# Patient Record
Sex: MALE | Race: WHITE | NOT HISPANIC OR LATINO | Employment: OTHER | URBAN - METROPOLITAN AREA
[De-identification: names, ages, dates, MRNs, and addresses within clinical notes are randomized per-mention and may not be internally consistent; named-entity substitution may affect disease eponyms.]

---

## 2017-01-24 ENCOUNTER — TRANSFERRED RECORDS (OUTPATIENT)
Dept: HEALTH INFORMATION MANAGEMENT | Facility: CLINIC | Age: 62
End: 2017-01-24

## 2017-01-31 ENCOUNTER — TRANSFERRED RECORDS (OUTPATIENT)
Dept: HEALTH INFORMATION MANAGEMENT | Facility: CLINIC | Age: 62
End: 2017-01-31

## 2017-02-02 ENCOUNTER — PRE VISIT (OUTPATIENT)
Dept: OTOLARYNGOLOGY | Facility: CLINIC | Age: 62
End: 2017-02-02

## 2017-02-02 NOTE — TELEPHONE ENCOUNTER
1.  Date/reason for appt: 2/21/17- hearing loss, discuss surgery     2.  Referring provider: Dr. Coyne    3.  Call to patient (Yes / No - short description): No, was transferred to [facility] voicemail.     4.  Previous care at / records requested from: [facility] - faxed cover sheet to [phone].

## 2017-02-06 NOTE — TELEPHONE ENCOUNTER
Records received from [facility], will forward to clinic.  Included:   Audiogram on 1/24/17  10/17/16 septoplasty

## 2017-02-20 DIAGNOSIS — H91.90 HEARING LOSS: Primary | ICD-10-CM

## 2017-02-21 ENCOUNTER — OFFICE VISIT (OUTPATIENT)
Dept: AUDIOLOGY | Facility: CLINIC | Age: 62
End: 2017-02-21

## 2017-02-21 ENCOUNTER — OFFICE VISIT (OUTPATIENT)
Dept: OTOLARYNGOLOGY | Facility: CLINIC | Age: 62
End: 2017-02-21

## 2017-02-21 VITALS — BODY MASS INDEX: 29.12 KG/M2 | HEIGHT: 71 IN | WEIGHT: 208 LBS

## 2017-02-21 DIAGNOSIS — H90.5 SENSORINEURAL HEARING LOSS OF RIGHT EAR: ICD-10-CM

## 2017-02-21 DIAGNOSIS — H93.13 TINNITUS OF BOTH EARS: ICD-10-CM

## 2017-02-21 DIAGNOSIS — H90.2 CONDUCTIVE HEARING LOSS: Primary | ICD-10-CM

## 2017-02-21 DIAGNOSIS — H90.72 MIXED HEARING LOSS OF LEFT EAR: ICD-10-CM

## 2017-02-21 ASSESSMENT — PAIN SCALES - GENERAL: PAINLEVEL: MILD PAIN (2)

## 2017-02-21 NOTE — MR AVS SNAPSHOT
After Visit Summary   2/21/2017    Jeffy Machado    MRN: [id number]           Patient Information     Date Of Birth          1955        Visit Information        Provider Department      2/21/2017 8:30 AM Mariana Tovar AuD [facility] Audiology        Today's Diagnoses     Sensorineural hearing loss of right ear        Mixed hearing loss of left ear        Tinnitus of both ears           Follow-ups after your visit        Your next 10 appointments already scheduled     Feb 21, 2017  9:10 AM CST   (Arrive by 8:55 AM)   New Patient Visit with Mira Kahn MD   [facility] Th[facility] ([facility] [place])    [address]  [address] [facility] [address] [phone]              Who to contact     Please call your clinic at [phone] to:    Ask questions about your health    Make or cancel appointments    Discuss your medicines    Learn about your test results    Speak to your doctor   If you have compliments or concerns about an experience at your clinic, or if you wish to file a complaint, please contact [facility] Patient Relations at [phone] or email us at [email or web address]         Additional Information About Your Visit        MyChart Information     [employer] gives you secure access to your electronic health record. If you see a primary care provider, you can also send messages to your care team and make appointments. If you have questions, please call your primary care clinic.  If you do not have a primary care provider, please call [phone] and they will assist you.      [employer] is an electronic gateway that provides easy, online access to your medical records. With [employer], you can request a clinic appointment, read your test results, renew a prescription or communicate with your care team.     To access your existing account, please contact your [facility] or  call [phone] for assistance.        Care EveryWhere ID     This is your Care EveryWhere ID. This could be used by other organizations to access your [facility] medical records  [id number]         Blood Pressure from Last 3 Encounters:   No data found for BP    Weight from Last 3 Encounters:   No data found for Wt              We Performed the Following     AUDIOGRAM/TYMPANOGRAM - INTERFACE     [facility] ([id number])     Tymps / Reflex   ([id number])        Primary Care Provider    None Specified       No primary provider on file.        Thank you!     Thank you for choosing [facility] AUDIOLOGY  for your care. Our goal is always to provide you with excellent care. Hearing back from our patients is one way we can continue to improve our services. Please take a few minutes to complete the written survey that you may receive in the mail after your visit with us. Thank you!             Your Updated Medication List - Protect others around you: Learn how to safely use, store and throw away your medicines at www.disposemymeds.org.      Notice  As of 2/21/2017  8:49 AM    You have not been prescribed any medications.

## 2017-02-21 NOTE — LETTER
2/21/2017       RE: Jeffy Machado  [address] [address]     Dear Colleague,    Thank you for referring your patient, Jeffy Machado, to the [facility] EAR NOSE AND THROAT at [facility]. Please see a copy of my visit note below.    February 21, 2017         Sean Coyne MD   [facility]   [address], [address]   [place]   [address]      Dear Dr. Coyne:      Today I had the pleasure of meeting Jeffy Machado in consultation at your request for conductive hearing loss.      HISTORY OF PRESENT ILLNESS:  He is a 61-year-old man with a history of hearing loss over the past few years.  Mr. Machado reports that his hearing loss has been stable over the past two years or so.  He notices this hearing loss on the left.  He also has symptoms of aural fullness as well as occasional pain in the temporalis area on the left side.  Mr. Machado also reports clicking in the right ear but no pain or hearing loss on that side.  He has seen several ENT physicians prior to this visit.  Previous notes from [facility] report a left mixed hearing loss with a Carhart notch on audiograms in 2013 and 2014 (not available for review today).  Audiogram was repeated at [facility] in November of 2016 and was shown to have a left mixed hearing loss with a Carhart notch.  The patient was referred to Neurotology for further recommendations and also to Audiology to possibly pursue use of a hearing aid in that ear.  The patient is interested in pursuing surgical options.  He tried a hearing aid while in the Audiology Clinic and did not really like it because, while he could hear better, it did not remove his most bothersome symptom: aural fullness.  He reports that left aural fullness is much more bothersome to him than his hearing loss. He has no vertigo, dizziness, or significant tinnitus.         PAST MEDICAL HISTORY:  Deviated septum status post septoplasty.   Hypertension.      PAST SURGICAL HISTORY:  Septoplasty and bilateral inferior turbinate reduction in October, 2016.      MEDICATIONS:  Atenolol.     SOCIAL HISTORY:  Non-smoker.  Occasional alcohol.    FAMILY HISTORY:  Migraines in mother     REVIEW OF SYSTEMS:  10 point ROS was performed and reviewed, and was positive for nasal congestion, left aural fullness, right ear clicking.  Otherwise negative.     PHYSICAL EXAMINATION:  No acute distress.  Healthy appearing.  Head is atraumatic, normocephalic.  Extraocular movements are intact.  Pupils are equal round and reactive to light.  Face is House-Brackmann I out of VI bilaterally.  Both ears are examined under the microscope.  Bilateral tympanic membranes are intact with normal landmarks.  Middle ear clefts are well-aerated bilaterally.  512 Hz tuning fork lateralizes to left ear; on Rinne testing, left BC>AC and right AC>BC. Nasal passages with pink healthy mucosa.  Mild crusting in left nasal passage.  Septum straight and midline.  Oropharynx within normal limits.  Non-labored breathing on room air.  Cranial nerves II-XII grossly intact.        AUDIOGRAM:  Audiogram done on February 21, 2017.     Right:  Mild rising to normal hearing sloping to moderate severe sensorineural hearing loss in the high frequencies.  Word recognition score 96% at 55 dB.  Type A tympanogram.  Ipsilateral reflex present.  Left:  Severe mixed hearing loss in the low to mid-frequencies.  Air line closes gap with bone line at 3000 Hz then slopes back down to moderate mixed hearing loss at 6000 and 8000 Hz.  Word recognition score 100% at 100 dB.  Type A tympanogram.  Reflex is absent.        ASSESSMENT AND PLAN:  A 61-year-old man with a history of left hearing loss over the past few years and audiograms consistent with left otosclerosis or other conductive loss.  The patient reports a sensation of aural fullness as well, and indicated that the fullness is a much greater concern to him than  the hearing loss.  We explained that stapes surgery for otosclerosis is intended to improve hearing, and may not necessarily address his aural fullness if it is secondary to TMJ dysfunction or other causes.  The symptom can be observed with conductive losses, but his symptom seems out of proportion to what I have usually observed in patients with similar degrees of conductive loss. Given this, I recommended that he obtain a CT temporal bone to further assess structures in his middle ear and inner ear which will also give us more data to make a decision about surgery.  The patient will continue to think about a left hearing aid as well as surgical options.  We discussed what stapes surgery entails, reviewed the potential hearing benefits, and reviewed the common and serious risks in detail.    We will call him with the results of his CT and talk further at that time.     Thank you for the opportunity to participate in his care.      Michelle Burroughs MD   Otolaryngology, Head and Neck Surgery PGY2      Sincerely,       Mira Kahn M.D.    Neurotology    [phone]          TH/ms       I, Mira Kahn, saw this patient with the resident/fellow and agree with the resident s findings and plan of care as documented in the resident s/fellow s note. I was present for the entire procedure.      Again, thank you for allowing me to participate in the care of your patient.      Sincerely,    Mira Kahn MD

## 2017-02-21 NOTE — PROGRESS NOTES
AUDIOLOGY REPORT    SUMMARY: Audiology visit completed. See audiogram for results.      RECOMMENDATIONS: Follow-up with ENT.      Sasha Walsh, CCC-A  Licensed Audiologist  [place] #[id number]

## 2017-02-21 NOTE — MR AVS SNAPSHOT
After Visit Summary   2/21/2017    Jeffy Machado    MRN: [id number]           Patient Information     Date Of Birth          1955        Visit Information        Provider Department      2/21/2017 9:10 AM Mira Kahn MD [facility] Ear Nose and Throat        Today's Diagnoses     Conductive hearing loss    -  1      Care Instructions    -Please schedule CT scan to further evaluate hearing loss. Our clinic will call with results once available.        Follow-ups after your visit        Your next 10 appointments already scheduled     Mar 22, 2017 11:20 AM CDT   CT TEMPORAL ORBITAL SELLA W/O CONTRAST with UCCT1   [facility] ([facility])    [address]  [facility] [address] [phone]           Please bring any scans or X-rays taken at other hospitals, if similar tests were done. Also bring a list of your medicines, including vitamins, minerals and over-the-counter drugs. It is safest to leave personal items at home.  Be sure to tell your doctor:   If you have any allergies.   If there s any chance you are pregnant.   If you are breastfeeding.   If you have any special needs.  You do not need to do anything special to prepare.  Please wear loose clothing, such as a sweat suit or jogging clothes. Avoid snaps, zippers and other metal. We may ask you to undress and put on a hospital gown.              Who to contact     Please call your clinic at [phone] to:    Ask questions about your health    Make or cancel appointments    Discuss your medicines    Learn about your test results    Speak to your doctor   If you have compliments or concerns about an experience at your clinic, or if you wish to file a complaint, please contact [facility] Patient Relations at [phone] or email us at [email or web address]         Additional Information About Your Visit        MyChart Information      "[employer] gives you secure access to your electronic health record. If you see a primary care provider, you can also send messages to your care team and make appointments. If you have questions, please call your primary care clinic.  If you do not have a primary care provider, please call [phone] and they will assist you.      [employer] is an electronic gateway that provides easy, online access to your medical records. With [employer], you can request a clinic appointment, read your test results, renew a prescription or communicate with your care team.     To access your existing account, please contact your [facility] or call [phone] for assistance.        Care EveryWhere ID     This is your Care EveryWhere ID. This could be used by other organizations to access your [facility] medical records  [id number]        Your Vitals Were     Height BMI (Body Mass Index)                1.791 m (5' 10.5\") 29.42 kg/m2           Blood Pressure from Last 3 Encounters:   No data found for BP    Weight from Last 3 Encounters:   02/21/17 94.3 kg (208 lb)              We Performed the Following     BINOCULAR MICROSCOPY        Primary Care Provider    None Specified       No primary provider on file.        Thank you!     Thank you for choosing [facility] EAR NOSE AND THROAT  for your care. Our goal is always to provide you with excellent care. Hearing back from our patients is one way we can continue to improve our services. Please take a few minutes to complete the written survey that you may receive in the mail after your visit with us. Thank you!             Your Updated Medication List - Protect others around you: Learn how to safely use, store and throw away your medicines at www.disposemymeds.org.          This list is accurate as of: 2/21/17 11:59 PM.  Always use your most recent med list.                   Brand Name Dispense Instructions for use    ATENOLOL PO      Take 25 mg by mouth daily "

## 2017-02-21 NOTE — PATIENT INSTRUCTIONS
-Please schedule CT scan to further evaluate hearing loss. Our clinic will call with results once available.

## 2017-02-21 NOTE — PROGRESS NOTES
February 21, 2017         Sean Coyne MD   [facility]   [address]   [place] [address]      Dear Dr. Coyne:      Today I had the pleasure of meeting Jeffy Machado in consultation at your request for conductive hearing loss.      HISTORY OF PRESENT ILLNESS:  He is a 61-year-old man with a history of hearing loss over the past few years.  Mr. Machado reports that his hearing loss has been stable over the past two years or so.  He notices this hearing loss on the left.  He also has symptoms of aural fullness as well as occasional pain in the temporalis area on the left side.  Mr. Machado also reports clicking in the right ear but no pain or hearing loss on that side.  He has seen several ENT physicians prior to this visit.  Previous notes from [facility] report a left mixed hearing loss with a Carhart notch on audiograms in 2013 and 2014 (not available for review today).  Audiogram was repeated at [facility] in November of 2016 and was shown to have a left mixed hearing loss with a Carhart notch.  The patient was referred to Neurotology for further recommendations and also to Audiology to possibly pursue use of a hearing aid in that ear.  The patient is interested in pursuing surgical options.  He tried a hearing aid while in the Audiology Clinic and did not really like it because, while he could hear better, it did not remove his most bothersome symptom: aural fullness.  He reports that left aural fullness is much more bothersome to him than his hearing loss. He has no vertigo, dizziness, or significant tinnitus.         PAST MEDICAL HISTORY:  Deviated septum status post septoplasty.  Hypertension.      PAST SURGICAL HISTORY:  Septoplasty and bilateral inferior turbinate reduction in October, 2016.      MEDICATIONS:  Atenolol.     SOCIAL HISTORY:  Non-smoker.  Occasional alcohol.    FAMILY HISTORY:  Migraines in mother     REVIEW OF SYSTEMS:  10 point ROS was performed  and reviewed, and was positive for nasal congestion, left aural fullness, right ear clicking.  Otherwise negative.     PHYSICAL EXAMINATION:  No acute distress.  Healthy appearing.  Head is atraumatic, normocephalic.  Extraocular movements are intact.  Pupils are equal round and reactive to light.  Face is House-Brackmann I out of VI bilaterally.  Both ears are examined under the microscope.  Bilateral tympanic membranes are intact with normal landmarks.  Middle ear clefts are well-aerated bilaterally.  512 Hz tuning fork lateralizes to left ear; on Rinne testing, left BC>AC and right AC>BC. Nasal passages with pink healthy mucosa.  Mild crusting in left nasal passage.  Septum straight and midline.  Oropharynx within normal limits.  Non-labored breathing on room air.  Cranial nerves II-XII grossly intact.        AUDIOGRAM:  Audiogram done on February 21, 2017.     Right:  Mild rising to normal hearing sloping to moderate severe sensorineural hearing loss in the high frequencies.  Word recognition score 96% at 55 dB.  Type A tympanogram.  Ipsilateral reflex present.  Left:  Severe mixed hearing loss in the low to mid-frequencies.  Air line closes gap with bone line at 3000 Hz then slopes back down to moderate mixed hearing loss at 6000 and 8000 Hz.  Word recognition score 100% at 100 dB.  Type A tympanogram.  Reflex is absent.        ASSESSMENT AND PLAN:  A 61-year-old man with a history of left hearing loss over the past few years and audiograms consistent with left otosclerosis or other conductive loss.  The patient reports a sensation of aural fullness as well, and indicated that the fullness is a much greater concern to him than the hearing loss.  We explained that stapes surgery for otosclerosis is intended to improve hearing, and may not necessarily address his aural fullness if it is secondary to TMJ dysfunction or other causes.  The symptom can be observed with conductive losses, but his symptom seems out of  proportion to what I have usually observed in patients with similar degrees of conductive loss. Given this, I recommended that he obtain a CT temporal bone to further assess structures in his middle ear and inner ear which will also give us more data to make a decision about surgery.  The patient will continue to think about a left hearing aid as well as surgical options.  We discussed what stapes surgery entails, reviewed the potential hearing benefits, and reviewed the common and serious risks in detail.    We will call him with the results of his CT and talk further at that time.     Thank you for the opportunity to participate in his care.      Michelle Burroughs MD   Otolaryngology, Head and Neck Surgery PGY2      Sincerely,       Mira Kahn M.D.    Neurotology    [phone]          TH/ms       I, Mira Kahn, saw this patient with the resident/fellow and agree with the resident s findings and plan of care as documented in the resident s/fellow s note. I was present for the entire procedure.

## 2017-03-28 ENCOUNTER — TELEPHONE (OUTPATIENT)
Dept: OTOLARYNGOLOGY | Facility: CLINIC | Age: 62
End: 2017-03-28

## 2017-03-28 NOTE — TELEPHONE ENCOUNTER
Pt called triage wondering about his CT scan results ordered by Dr. Kahn.  Pt was hoping someone would be able to read the results and tell him how this affects his plan of care, or if he would need surgery in the future.  RN will coordinate with Dr. Kahn/RNCC for more information.    Evelyn Broussard RN  [phone]  [facility]   Head & Neck Surgery

## 2017-04-07 DIAGNOSIS — H80.92 OTOSCLEROSIS, LEFT: Primary | ICD-10-CM

## 2017-05-02 ENCOUNTER — OFFICE VISIT (OUTPATIENT)
Dept: OTOLARYNGOLOGY | Facility: CLINIC | Age: 62
End: 2017-05-02

## 2017-05-02 VITALS — HEIGHT: 70 IN | BODY MASS INDEX: 30.64 KG/M2 | WEIGHT: 214 LBS

## 2017-05-02 DIAGNOSIS — H80.92 OTOSCLEROSIS, LEFT: Primary | ICD-10-CM

## 2017-05-02 ASSESSMENT — PAIN SCALES - GENERAL: PAINLEVEL: NO PAIN (0)

## 2017-05-02 NOTE — NURSING NOTE
Relevant Diagnosis: otosclerosis  Teaching Topic: left stapedectomy  Person(s) involved in teaching:  Patient and wife     Teaching Concerns Addressed:  Pre op teaching included the need for an H&P, NPO status pre op, hospital routines, expected recovery, activity  restrictions, antimicrobial scrub, s/s of infection, pain control methods and the importance of follow up appointments.  The patient voiced an understanding of all instructions and will call with questions.     Motivation Level:  Asks Questions:   Yes  Eager to Learn:   Yes  Cooperative:   Yes  Receptive (willing/able to accept information):   Yes     Patient  demonstrates understanding of the following:  Reason for the appointment, diagnosis and treatment plan:   Yes  Knowledge of proper use of medications and conditions for which they are ordered (with special attention to potential side effects or drug interactions):   Yes  Which situations necessitate calling provider and whom to contact:   Yes        Proper use and care of  (medical equip, care aids, etc.):   NA  Nutritional needs and diet plan:   Yes  Pain management techniques:   Yes  Patient instructed on hand hygiene:  Yes  How and/when to access community resources:   NA     Infection Prevention:  Patient   demonstrates understanding of the following:  Surgical procedure site care taught   Signs and symptoms of infection taught Yes  Wound care taught Yes     Instructional Materials Used/Given: Pre op booklet. Pre op soap given.    Amna Esquivel RN, BSN.  #[phone]  5/2/2017 2:07 PM

## 2017-05-02 NOTE — LETTER
2017       RE: Jeffy Machado  [address] [address]     Dear Colleague,    Thank you for referring your patient, Jeffy Machado, to the [facility] AND [facility] at [facility]. Please see a copy of my visit note below.    May 2, 2017         Sean Coyne MD   [facility]   [address] [place] [address]   [place]  [address]      RE: Jeffy Machado   MRN: [id number]   : 1955      Dear Dr. Coyne:      We had the pleasure of seeing Mr. Jeffy Machado in followup today regarding his conductive hearing loss.      HISTORY OF PRESENT ILLNESS:  Mr. Machado is a 62-year-old gentleman with a history of hearing loss over several years.  He has had gradual loss in his left ear.  He also has had some concerns regarding some left aural fullness that has begun more recently.  He was initially seen in our clinic in 2017 with otosclerosis considered to be the most likely cause of his conductive loss.  However, given his aural fullness, we recommended that he obtain a CT scan of the temporal bone to further assess the middle ear and inner ear structures prior to proceeding with any surgery.      The patient returns today to discuss surgery.  He did have a CT scan which demonstrated evidence of otosclerotic disease and he has scheduled a stapedectomy for 2017.      The patient denies a significant change in his hearing.  He does report significant loss in his left ear.  He does get occasional tinnitus.  He continues to have some left aural fullness.  However, he feels that this has improved since his prior visit.  He denies any current dizziness.      REVIEW OF SYSTEMS:  He had a 12-point review of systems performed.  Those positives include hearing loss, nasal congestion or drainage.  He also has a history of hypertension which he reports is well controlled on atenolol.      PHYSICAL EXAMINATION:  In general, the patient is well appearing.   He is in no acute distress.  He communicated properly.  His face is symmetric at rest with good function.  House-Brackmann I out of VI bilaterally.  His external ears are normal.  External auditory canals are lined with healthy skin.  Tympanic membranes are intact.  There is no retraction or middle ear effusion noted.  Tuning fork examination is reported at 512 Hz.  The Mendoza lateralizes to the left.  Bone conduction greater than air conduction on the left and air conduction greater than bone conduction on the right.  He has no mucosal lesions.  His tongue is midline and mobile.  He has a normal gait.      AUDIOGRAM:  An audiogram had been obtained in 02/2017.  This was reviewed again with the patient.  This demonstrates on the right hand side that he has pure tone thresholds within the normal range.  Then this slopes down to a moderately high sensorineural hearing loss in the high frequencies.  His SRT on the right is 15 dB with a word-recognition score of 96% and normal type A tympanogram.  On the left, he has moderate to severe mixed hearing loss primarily of conductive component which also slips down to a moderate loss in the high frequencies.  His SRT is 70 dB with a word-recognition score of 100%.  He has normal type A tympanogram.  His ipsilateral right reflex is normal.  The ipsilateral left and contralateral right and left are absent.      IMAGING:  The CT scan of the temporal bone was reviewed.  This shows a normal aerated middle ear spaces.  He does have a normal-appearing ossicular chain on the left side with some evidence of otospongiosis surrounding the footplate.  The cochlea appears normal.  There are no abnormal bony changes surrounding the cochlea.  He also has some evidence of some early otosclerotic disease on the right side as well.        IMPRESSION AND PLAN:  Mr. Machado is a 62-year-old gentleman with a history of left conductive hearing loss most consistent with otosclerosis given his  audiogram as well as findings on CT scan.  The patient also reports a history of some family members with hearing loss as well.  We discussed the different management options with him again today.  He asked a number of questions regarding the use hearing aids which we reviewed with him and we would support a trial of hearing aids should he be interested in not proceeding with surgery initially.  We also discussed the surgery and reviewed the procedure and steps in detail with him.  We also discussed the risks of the surgery, including risks to the chorda tympani which may cause temporary or permanent taste changes.  We also discussed potential injury to the facial nerve, which would result in facial paralysis that may be temporary or permanent.  We also discussed the increased risk of dizziness given the significant otosclerotic disease noted around his footplate.  We reviewed the chances of no significant changes in hearing as well as loss of all hearing in that left side that may be permanent.  We discussed postoperative activity restrictions with him as well, and given the mixed loss on his left ear, we also discussed that at some point he may need a hearing aid on the left side as well despite the surgery given some of the sensorineural component of hearing loss as well.      The patient at this point is interested in proceeding with surgery later this month.  He had all his questions answered with him and his wife present.  We will move forward with the surgery and certainly we encouraged him to contact us should there be any other concerns in the interim.     Thank you again for the opportunity to participate in his care.     Sincerely,     Mira Kahn MD  Otology & Neurotology  [facility]    Dictated by Jareth MEDRANO, fellow    I, Mira Kahn, saw this patient with the resident/fellow and agree with the resident's findings and plan of care as documented in the resident's/fellow's  note.    Again, thank you for allowing me to participate in the care of your patient.      Sincerely,    Mira Kahn MD

## 2017-05-02 NOTE — MR AVS SNAPSHOT
After Visit Summary   5/2/2017    Jeffy Machado    MRN: [id number]           Patient Information     Date Of Birth          1955        Visit Information        Provider Department      5/2/2017 12:00 PM Mira Kahn MD [facility]        Care Instructions    What should I know before the operation?  Stapes surgery is usually performed on an outpatient basis. Your medical history, physical examination, a discussion about details of your surgical procedure and the associated risks and benefits will be addressed during the preoperative visit.   If you have symptoms of a cold, ear drainage , ear pain or infection 2-3 weeks or less prior to the date scheduled for your operation, you should report this at once to your doctor.  What can I expect after the operation?  The evening after the operation you should lie quietly on the unoperated ear. Do not be alarmed if you have some imbalance for the first few days after the operation. Following the stapedectomy procedure, most patients are able to go home that evening.   At the time of surgery,  dissolvable packing is placed in your ear canal. You may not notice a hearing improvement until this is cleaned in the office 2-3 weeks after the surgery. Expect that it will be approximately 8 weeks for the hearing to reach its maximum level of improvement.We will have your hearing tested at the 8 week post op noam.  Do not to blow your nose, avoid sneezing with mouth closed or stifling a sneeze, bear down, lift over 15 pounds/ strain to lift  for 3 weeks after surgery. Do not go swimming. Do not immerse ear in water.  Lifting restrictions and getting the ear wet will be reviewed at your post-op appointment.   Maintain dry ear precautions.    When you shampoo or shower, place a cotton ball in your outer ear canal, then cover the outside surface of cotton ball liberally with petroleum jelly to create a seal and keep all water out of the ear  canal.     You will be placed on a short course of steroids postoperatively to proactively help protect the ear after surgery, and  may be given a prescription for antibiotics to be taken orally. You will be given ear drops to be started approximately 3 days prior to post op appointment to help dissolve the absorbable packing and make ear cleaning simpler at time of appointment.  -If at any time after surgery, you should experience sudden hearing loss, pain, dizziness, or any new symptom related to the operated ear, you should notify your doctor immediately. Complications are very rare. However, early intervention may influence the final outcome.  Surgery is performed on an outpatient basis. Your pre-op medical history & physical will be done by primary physician.          Follow-ups after your visit        Your next 10 appointments already scheduled     May 26, 2017  8:00 AM CDT   (Arrive by 7:45 AM)   PAC PHONE RN ASSESSMENT with Ishmael Pac Rn   [facility] ([facility])    [address] [place] [address]  [address] [place] [id number] [phone]           Note: this is not an onsite visit; there is no need to come to the facility.            May 31, 2017   Procedure with Mira Kahn MD   [facility] ([facility])    [address] [place] [address]  [address] [place] [id number] [phone]           Located in the Clinics and Surgery Center at [address] [place] [address].   parking is very convenient and highly recommended.  is a $6 flat rate fee.  Both  and self parkers should enter the main arrival plaza from [facility]; parking attendants will direct you based on your parking preference.            Jun 23, 2017 10:30 AM CDT   (Arrive by 10:15 AM)   Return Visit with Mira Kahn MD   [facility] ([facility])    [address] [place] [address]  "[address]  [address] [facility] [address] [phone]              Who to contact     Please call your clinic at [phone] to:    Ask questions about your health    Make or cancel appointments    Discuss your medicines    Learn about your test results    Speak to your doctor   If you have compliments or concerns about an experience at your clinic, or if you wish to file a complaint, please contact [facility] Patient Relations at [phone] or email us at [email or web address]         Additional Information About Your Visit        Computer[employer] Information     [employer] gives you secure access to your electronic health record. If you see a primary care provider, you can also send messages to your care team and make appointments. If you have questions, please call your primary care clinic.  If you do not have a primary care provider, please call [phone] and they will assist you.      V[employer]ana is an electronic gateway that provides easy, online access to your medical records. With [employer], you can request a clinic appointment, read your test results, renew a prescription or communicate with your care team.     To access your existing account, please contact your [facility] or call [phone] for assistance.        Care EveryWhere ID     This is your Care EveryWhere ID. This could be used by other organizations to access your [place] medical records  [id number]        Your Vitals Were     Height BMI (Body Mass Index)                1.77 m (5' 9.69\") 30.98 kg/m2           Blood Pressure from Last 3 Encounters:   No data found for BP    Weight from Last 3 Encounters:   05/02/17 97.1 kg (214 lb)   02/21/17 94.3 kg (208 lb)              Today, you had the following     No orders found for display       Primary Care Provider Office Phone # Fax #    Marcello Lopez [phone]       [facility] [address] [facility] " [place] [address]        Thank you!     Thank you for choosing [facility]SE [facility] TH[facility]AT  for your care. Our goal is always to provide you with excellent care. Hearing back from our patients is one way we can continue to improve our services. Please take a few minutes to complete the written survey that you may receive in the mail after your visit with us. Thank you!             Your Updated Medication List - Protect others around you: Learn how to safely use, store and throw away your medicines at www.disposemymeds.org.          This list is accurate as of: 5/2/17  1:49 PM.  Always use your most recent med list.                   Brand Name Dispense Instructions for use    ATENOLOL PO      Take 25 mg by mouth daily

## 2017-05-02 NOTE — PROGRESS NOTES
May 2, 2017         Sean Coyne MD   [facility]   [address] [place], [place] [address]      RE: Jeffy Machado   MRN: [id number]   : 1955      Dear Dr. Coyne:      We had the pleasure of seeing Mr. Jeffy Machado in followup today regarding his conductive hearing loss.      HISTORY OF PRESENT ILLNESS:  Mr. Machado is a 62-year-old gentleman with a history of hearing loss over several years.  He has had gradual loss in his left ear.  He also has had some concerns regarding some left aural fullness that has begun more recently.  He was initially seen in our clinic in 2017 with otosclerosis considered to be the most likely cause of his conductive loss.  However, given his aural fullness, we recommended that he obtain a CT scan of the temporal bone to further assess the middle ear and inner ear structures prior to proceeding with any surgery.      The patient returns today to discuss surgery.  He did have a CT scan which demonstrated evidence of otosclerotic disease and he has scheduled a stapedectomy for 2017.      The patient denies a significant change in his hearing.  He does report significant loss in his left ear.  He does get occasional tinnitus.  He continues to have some left aural fullness.  However, he feels that this has improved since his prior visit.  He denies any current dizziness.      REVIEW OF SYSTEMS:  He had a 12-point review of systems performed.  Those positives include hearing loss, nasal congestion or drainage.  He also has a history of hypertension which he reports is well controlled on atenolol.      PHYSICAL EXAMINATION:  In general, the patient is well appearing.  He is in no acute distress.  He communicated properly.  His face is symmetric at rest with good function.  House-Brackmann I out of VI bilaterally.  His external ears are normal.  External auditory canals are lined with healthy skin.  Tympanic membranes are intact.  There is no  retraction or middle ear effusion noted.  Tuning fork examination is reported at 512 Hz.  The Mendoza lateralizes to the left.  Bone conduction greater than air conduction on the left and air conduction greater than bone conduction on the right.  He has no mucosal lesions.  His tongue is midline and mobile.  He has a normal gait.      AUDIOGRAM:  An audiogram had been obtained in 02/2017.  This was reviewed again with the patient.  This demonstrates on the right hand side that he has pure tone thresholds within the normal range.  Then this slopes down to a moderately high sensorineural hearing loss in the high frequencies.  His SRT on the right is 15 dB with a word-recognition score of 96% and normal type A tympanogram.  On the left, he has moderate to severe mixed hearing loss primarily of conductive component which also slips down to a moderate loss in the high frequencies.  His SRT is 70 dB with a word-recognition score of 100%.  He has normal type A tympanogram.  His ipsilateral right reflex is normal.  The ipsilateral left and contralateral right and left are absent.      IMAGING:  The CT scan of the temporal bone was reviewed.  This shows a normal aerated middle ear spaces.  He does have a normal-appearing ossicular chain on the left side with some evidence of otospongiosis surrounding the footplate.  The cochlea appears normal.  There are no abnormal bony changes surrounding the cochlea.  He also has some evidence of some early otosclerotic disease on the right side as well.        IMPRESSION AND PLAN:  Mr. Machado is a 62-year-old gentleman with a history of left conductive hearing loss most consistent with otosclerosis given his audiogram as well as findings on CT scan.  The patient also reports a history of some family members with hearing loss as well.  We discussed the different management options with him again today.  He asked a number of questions regarding the use hearing aids which we reviewed with him  and we would support a trial of hearing aids should he be interested in not proceeding with surgery initially.  We also discussed the surgery and reviewed the procedure and steps in detail with him.  We also discussed the risks of the surgery, including risks to the chorda tympani which may cause temporary or permanent taste changes.  We also discussed potential injury to the facial nerve, which would result in facial paralysis that may be temporary or permanent.  We also discussed the increased risk of dizziness given the significant otosclerotic disease noted around his footplate.  We reviewed the chances of no significant changes in hearing as well as loss of all hearing in that left side that may be permanent.  We discussed postoperative activity restrictions with him as well, and given the mixed loss on his left ear, we also discussed that at some point he may need a hearing aid on the left side as well despite the surgery given some of the sensorineural component of hearing loss as well.      The patient at this point is interested in proceeding with surgery later this month.  He had all his questions answered with him and his wife present.  We will move forward with the surgery and certainly we encouraged him to contact us should there be any other concerns in the interim.     Thank you again for the opportunity to participate in his care.     Sincerely,     Mira Kahn MD  Otology & Neurotology  [facility]    Dictated by Jareth MEDRANO, fellow    I, Mira Kahn, saw this patient with the resident/fellow and agree with the resident's findings and plan of care as documented in the resident's/fellow's note.

## 2017-05-02 NOTE — PATIENT INSTRUCTIONS
What should I know before the operation?  Stapes surgery is usually performed on an outpatient basis. Your medical history, physical examination, a discussion about details of your surgical procedure and the associated risks and benefits will be addressed during the preoperative visit.   If you have symptoms of a cold, ear drainage , ear pain or infection 2-3 weeks or less prior to the date scheduled for your operation, you should report this at once to your doctor.  What can I expect after the operation?  The evening after the operation you should lie quietly on the unoperated ear. Do not be alarmed if you have some imbalance for the first few days after the operation. Following the stapedectomy procedure, most patients are able to go home that evening.   At the time of surgery,  dissolvable packing is placed in your ear canal. You may not notice a hearing improvement until this is cleaned in the office 2-3 weeks after the surgery. Expect that it will be approximately 8 weeks for the hearing to reach its maximum level of improvement.We will have your hearing tested at the 8 week post op noam.  Do not to blow your nose, avoid sneezing with mouth closed or stifling a sneeze, bear down, lift over 15 pounds/ strain to lift  for 3 weeks after surgery. Do not go swimming. Do not immerse ear in water.  Lifting restrictions and getting the ear wet will be reviewed at your post-op appointment.   Maintain dry ear precautions.    When you shampoo or shower, place a cotton ball in your outer ear canal, then cover the outside surface of cotton ball liberally with petroleum jelly to create a seal and keep all water out of the ear canal.     You will be placed on a short course of steroids postoperatively to proactively help protect the ear after surgery, and  may be given a prescription for antibiotics to be taken orally. You will be given ear drops to be started approximately 3 days prior to post op appointment to help dissolve  the absorbable packing and make ear cleaning simpler at time of appointment.  -If at any time after surgery, you should experience sudden hearing loss, pain, dizziness, or any new symptom related to the operated ear, you should notify your doctor immediately. Complications are very rare. However, early intervention may influence the final outcome.  Surgery is performed on an outpatient basis. Your pre-op medical history & physical will be done by primary physician.

## 2017-05-25 ENCOUNTER — TRANSFERRED RECORDS (OUTPATIENT)
Dept: HEALTH INFORMATION MANAGEMENT | Facility: CLINIC | Age: 62
End: 2017-05-25

## 2017-05-26 ENCOUNTER — APPOINTMENT (OUTPATIENT)
Dept: SURGERY | Facility: CLINIC | Age: 62
End: 2017-05-26

## 2017-05-30 ENCOUNTER — ANESTHESIA EVENT (OUTPATIENT)
Dept: SURGERY | Facility: AMBULATORY SURGERY CENTER | Age: 62
End: 2017-05-30

## 2017-05-31 ENCOUNTER — HOSPITAL ENCOUNTER (OUTPATIENT)
Facility: AMBULATORY SURGERY CENTER | Age: 62
End: 2017-05-31
Attending: OTOLARYNGOLOGY

## 2017-05-31 ENCOUNTER — ANESTHESIA (OUTPATIENT)
Dept: SURGERY | Facility: AMBULATORY SURGERY CENTER | Age: 62
End: 2017-05-31

## 2017-05-31 ENCOUNTER — SURGERY (OUTPATIENT)
Age: 62
End: 2017-05-31

## 2017-05-31 VITALS
HEIGHT: 70 IN | HEART RATE: 47 BPM | DIASTOLIC BLOOD PRESSURE: 74 MMHG | OXYGEN SATURATION: 96 % | RESPIRATION RATE: 16 BRPM | TEMPERATURE: 97.5 F | SYSTOLIC BLOOD PRESSURE: 113 MMHG | WEIGHT: 210 LBS | BODY MASS INDEX: 30.06 KG/M2

## 2017-05-31 DIAGNOSIS — G89.18 POSTOPERATIVE PAIN: Primary | ICD-10-CM

## 2017-05-31 DIAGNOSIS — H80.92 OTOSCLEROSIS, LEFT: ICD-10-CM

## 2017-05-31 LAB
CREAT SERPL-MCNC: 0.92 MG/DL (ref 0.66–1.25)
GFR SERPL CREATININE-BSD FRML MDRD: 84 ML/MIN/1.7M2
POTASSIUM SERPL-SCNC: 3.1 MMOL/L (ref 3.4–5.3)

## 2017-05-31 DEVICE — EAR IMP STAPLES CLASSIC BUCKET 0.4X4.0X1.0MM TI: Type: IMPLANTABLE DEVICE | Site: EAR | Status: FUNCTIONAL

## 2017-05-31 RX ORDER — ONDANSETRON 2 MG/ML
INJECTION INTRAMUSCULAR; INTRAVENOUS PRN
Status: DISCONTINUED | OUTPATIENT
Start: 2017-05-31 | End: 2017-05-31

## 2017-05-31 RX ORDER — MEPERIDINE HYDROCHLORIDE 25 MG/ML
12.5 INJECTION INTRAMUSCULAR; INTRAVENOUS; SUBCUTANEOUS
Status: DISCONTINUED | OUTPATIENT
Start: 2017-05-31 | End: 2017-06-01 | Stop reason: HOSPADM

## 2017-05-31 RX ORDER — FENTANYL CITRATE 50 UG/ML
25-50 INJECTION, SOLUTION INTRAMUSCULAR; INTRAVENOUS
Status: DISCONTINUED | OUTPATIENT
Start: 2017-05-31 | End: 2017-06-01 | Stop reason: HOSPADM

## 2017-05-31 RX ORDER — FENTANYL CITRATE 50 UG/ML
INJECTION, SOLUTION INTRAMUSCULAR; INTRAVENOUS PRN
Status: DISCONTINUED | OUTPATIENT
Start: 2017-05-31 | End: 2017-05-31

## 2017-05-31 RX ORDER — PROPOFOL 10 MG/ML
INJECTION, EMULSION INTRAVENOUS CONTINUOUS PRN
Status: DISCONTINUED | OUTPATIENT
Start: 2017-05-31 | End: 2017-05-31

## 2017-05-31 RX ORDER — CIPROFLOXACIN AND DEXAMETHASONE 3; 1 MG/ML; MG/ML
SUSPENSION/ DROPS AURICULAR (OTIC) PRN
Status: DISCONTINUED | OUTPATIENT
Start: 2017-05-31 | End: 2017-05-31 | Stop reason: HOSPADM

## 2017-05-31 RX ORDER — SODIUM CHLORIDE, SODIUM LACTATE, POTASSIUM CHLORIDE, CALCIUM CHLORIDE 600; 310; 30; 20 MG/100ML; MG/100ML; MG/100ML; MG/100ML
INJECTION, SOLUTION INTRAVENOUS CONTINUOUS
Status: DISCONTINUED | OUTPATIENT
Start: 2017-05-31 | End: 2017-05-31 | Stop reason: HOSPADM

## 2017-05-31 RX ORDER — ONDANSETRON 4 MG/1
4 TABLET, ORALLY DISINTEGRATING ORAL EVERY 30 MIN PRN
Status: DISCONTINUED | OUTPATIENT
Start: 2017-05-31 | End: 2017-06-01 | Stop reason: HOSPADM

## 2017-05-31 RX ORDER — PROPOFOL 10 MG/ML
INJECTION, EMULSION INTRAVENOUS PRN
Status: DISCONTINUED | OUTPATIENT
Start: 2017-05-31 | End: 2017-05-31

## 2017-05-31 RX ORDER — ACETAMINOPHEN 325 MG/1
975 TABLET ORAL ONCE
Status: DISCONTINUED | OUTPATIENT
Start: 2017-05-31 | End: 2017-05-31 | Stop reason: HOSPADM

## 2017-05-31 RX ORDER — LIDOCAINE HYDROCHLORIDE AND EPINEPHRINE 10; 10 MG/ML; UG/ML
INJECTION, SOLUTION INFILTRATION; PERINEURAL PRN
Status: DISCONTINUED | OUTPATIENT
Start: 2017-05-31 | End: 2017-05-31 | Stop reason: HOSPADM

## 2017-05-31 RX ORDER — NALOXONE HYDROCHLORIDE 0.4 MG/ML
.1-.4 INJECTION, SOLUTION INTRAMUSCULAR; INTRAVENOUS; SUBCUTANEOUS
Status: DISCONTINUED | OUTPATIENT
Start: 2017-05-31 | End: 2017-06-01 | Stop reason: HOSPADM

## 2017-05-31 RX ORDER — CEFUROXIME AXETIL 250 MG/1
250 TABLET ORAL 2 TIMES DAILY
Qty: 14 TABLET | Refills: 0 | Status: SHIPPED | OUTPATIENT
Start: 2017-05-31 | End: 2017-06-07

## 2017-05-31 RX ORDER — ONDANSETRON 2 MG/ML
4 INJECTION INTRAMUSCULAR; INTRAVENOUS EVERY 30 MIN PRN
Status: DISCONTINUED | OUTPATIENT
Start: 2017-05-31 | End: 2017-06-01 | Stop reason: HOSPADM

## 2017-05-31 RX ORDER — GABAPENTIN 300 MG/1
300 CAPSULE ORAL ONCE
Status: DISCONTINUED | OUTPATIENT
Start: 2017-05-31 | End: 2017-05-31 | Stop reason: HOSPADM

## 2017-05-31 RX ORDER — LIDOCAINE 40 MG/G
CREAM TOPICAL
Status: DISCONTINUED | OUTPATIENT
Start: 2017-05-31 | End: 2017-05-31 | Stop reason: HOSPADM

## 2017-05-31 RX ORDER — PREDNISONE 10 MG/1
TABLET ORAL
Qty: 24 TABLET | Refills: 0 | Status: SHIPPED | OUTPATIENT
Start: 2017-05-31 | End: 2017-08-15

## 2017-05-31 RX ORDER — DEXAMETHASONE SODIUM PHOSPHATE 10 MG/ML
10 INJECTION, SOLUTION INTRAMUSCULAR; INTRAVENOUS ONCE
Status: COMPLETED | OUTPATIENT
Start: 2017-05-31 | End: 2017-05-31

## 2017-05-31 RX ORDER — EPINEPHRINE NASAL SOLUTION 1 MG/ML
SOLUTION NASAL PRN
Status: DISCONTINUED | OUTPATIENT
Start: 2017-05-31 | End: 2017-05-31 | Stop reason: HOSPADM

## 2017-05-31 RX ORDER — HYDROCODONE BITARTRATE AND ACETAMINOPHEN 5; 325 MG/1; MG/1
1-2 TABLET ORAL EVERY 6 HOURS PRN
Qty: 30 TABLET | Refills: 0 | Status: SHIPPED | OUTPATIENT
Start: 2017-05-31 | End: 2017-08-15

## 2017-05-31 RX ORDER — POTASSIUM CHLORIDE 1.5 G/1.58G
20 POWDER, FOR SOLUTION ORAL 2 TIMES DAILY
COMMUNITY
End: 2017-08-15

## 2017-05-31 RX ORDER — SODIUM CHLORIDE, SODIUM LACTATE, POTASSIUM CHLORIDE, CALCIUM CHLORIDE 600; 310; 30; 20 MG/100ML; MG/100ML; MG/100ML; MG/100ML
INJECTION, SOLUTION INTRAVENOUS CONTINUOUS
Status: DISCONTINUED | OUTPATIENT
Start: 2017-05-31 | End: 2017-06-01 | Stop reason: HOSPADM

## 2017-05-31 RX ORDER — LIDOCAINE HYDROCHLORIDE 20 MG/ML
INJECTION, SOLUTION INFILTRATION; PERINEURAL PRN
Status: DISCONTINUED | OUTPATIENT
Start: 2017-05-31 | End: 2017-05-31

## 2017-05-31 RX ORDER — EPHEDRINE SULFATE 50 MG/ML
INJECTION, SOLUTION INTRAMUSCULAR; INTRAVENOUS; SUBCUTANEOUS PRN
Status: DISCONTINUED | OUTPATIENT
Start: 2017-05-31 | End: 2017-05-31

## 2017-05-31 RX ADMIN — SODIUM CHLORIDE, SODIUM LACTATE, POTASSIUM CHLORIDE, CALCIUM CHLORIDE: 600; 310; 30; 20 INJECTION, SOLUTION INTRAVENOUS at 06:54

## 2017-05-31 RX ADMIN — EPINEPHRINE NASAL SOLUTION 30 ML: 1 SOLUTION NASAL at 08:21

## 2017-05-31 RX ADMIN — ONDANSETRON 4 MG: 2 INJECTION INTRAMUSCULAR; INTRAVENOUS at 07:32

## 2017-05-31 RX ADMIN — PROPOFOL: 10 INJECTION, EMULSION INTRAVENOUS at 09:10

## 2017-05-31 RX ADMIN — PROPOFOL: 10 INJECTION, EMULSION INTRAVENOUS at 10:00

## 2017-05-31 RX ADMIN — Medication 0.5 MG: at 07:53

## 2017-05-31 RX ADMIN — LIDOCAINE HYDROCHLORIDE 100 MG: 20 INJECTION, SOLUTION INFILTRATION; PERINEURAL at 07:19

## 2017-05-31 RX ADMIN — PROPOFOL 200 MCG/KG/MIN: 10 INJECTION, EMULSION INTRAVENOUS at 07:19

## 2017-05-31 RX ADMIN — ONDANSETRON 4 MG: 2 INJECTION INTRAMUSCULAR; INTRAVENOUS at 10:53

## 2017-05-31 RX ADMIN — CIPROFLOXACIN AND DEXAMETHASONE 4 DROP: 3; 1 SUSPENSION/ DROPS AURICULAR (OTIC) at 08:09

## 2017-05-31 RX ADMIN — EPHEDRINE SULFATE 15 MG: 50 INJECTION, SOLUTION INTRAMUSCULAR; INTRAVENOUS; SUBCUTANEOUS at 07:37

## 2017-05-31 RX ADMIN — DEXAMETHASONE SODIUM PHOSPHATE 10 MG: 10 INJECTION, SOLUTION INTRAMUSCULAR; INTRAVENOUS at 07:32

## 2017-05-31 RX ADMIN — PROPOFOL: 10 INJECTION, EMULSION INTRAVENOUS at 08:22

## 2017-05-31 RX ADMIN — FENTANYL CITRATE 25 MCG: 50 INJECTION, SOLUTION INTRAMUSCULAR; INTRAVENOUS at 07:19

## 2017-05-31 RX ADMIN — Medication 100 MCG: at 08:46

## 2017-05-31 RX ADMIN — LIDOCAINE HYDROCHLORIDE AND EPINEPHRINE 3 ML: 10; 10 INJECTION, SOLUTION INFILTRATION; PERINEURAL at 08:07

## 2017-05-31 RX ADMIN — Medication 100 MCG: at 09:25

## 2017-05-31 RX ADMIN — PROPOFOL 200 MG: 10 INJECTION, EMULSION INTRAVENOUS at 07:19

## 2017-05-31 RX ADMIN — PROPOFOL: 10 INJECTION, EMULSION INTRAVENOUS at 07:48

## 2017-05-31 RX ADMIN — Medication 100 MCG: at 09:15

## 2017-05-31 RX ADMIN — EPHEDRINE SULFATE 10 MG: 50 INJECTION, SOLUTION INTRAMUSCULAR; INTRAVENOUS; SUBCUTANEOUS at 07:32

## 2017-05-31 NOTE — OP NOTE
CASE DATE:  [facility]2017[facility]      PREOPERATIVE DIAGNOSIS:  Left mixed hearing loss with a maximal conductive component.      POSTOPERATIVE DIAGNOSIS:  Left partially obliterative otosclerosis.      PROCEDURES:   1.  Left partial stapedectomy.   2.  Removal of a portion of obliterative otosclerosis.   3.  Continuous facial nerve monitoring.   4.  Use of Diode laser.      ATTENDING SURGEON:  Mira Kahn MD      RESIDENT SURGEON:  Randy Argueta MD      ANESTHESIA:  General.      ESTIMATED BLOOD LOSS:  5 mL.      INDICATIONS FOR PROCEDURE:  Mr. Machado has developed progressive left mixed conductive and sensorineural hearing loss.  There is evidence of otosclerosis based on his CT scan.  After counseling about options including observation, use of a hearing aid versus surgery, he elected to undergo surgery.  We discussed the common and serious risks in detail and informed consent was obtained.      INTRAOPERATIVE FINDINGS:  The malleus and incus have normal mobility.  The stapes was completely fixed by otosclerosis.  The otosclerotic disease included the fissula ante fenestram but also was prominently located on the superior aspect of the promontory and obscured the annular ligament of the footplate in its entire inferior course.  Otosclerosis also grew onto the superstructure, specifically was affixing the anterior rosina to the promontory, and this portion of the anterior rosina had to be removed separately to perform the procedure.  The footplate is slightly thickened in the nonobliterative segments and could be removed.  The facial nerve is covered by bone for much of its course.  There is also bright white otosclerotic bone inferior to the round window niche and along the anterior aspect of the round window niche.  One cannot appreciate the round window membrane given the prominent overhang.  But there is a niche itself present.  The prosthesis placed is a Gyrus classic stapes  prosthesis made of titanium that is 4 mm in length with a large well.      DESCRIPTION OF PROCEDURE:  The patient was brought to the operating room, placed supine on the operating room table.  General endotracheal anesthesia was administered.  The table was turned 180 degrees.  Facial nerve monitoring electrodes were placed within the left orbicularis oculi and orbicularis oris with a ground in the shoulder.  Tap test was performed, impedances were checked and verified and continuous facial nerve monitoring was performed throughout the procedure.  There were no aberrant stimulations noted.  The supra-auricular region was prepped and instilled with 1% lidocaine with 1:100,000 epinephrine.  The ear was then prepped and draped in the usual sterile fashion.  Underneath the otomicroscope, we visualized the ear canal.  It admit at best a #6 speculum; even then, there is slight impingement on the anterior canal wall, with a very oval-shaped and narrow ear canal.  The vascular strip and tympanomeatal flap regions were infiltrated with 1% lidocaine with 1:100,000 epinephrine.      Attention was then turned to harvesting a fascia graft.  Incision was carried down through the skin to the level of subcutaneous tissue and the temporalis fascia through a small supra-auricular incision.  The fascia graft was harvested and set aside to dry for later use.  Hemostasis was achieved with a Bovie and then the incision was closed with 3-0 chromic sutures followed by Steri-Strips and Mastisol at the end of the procedure.  Then underneath the otomicroscope, the tympanomeatal flap was elevated.  The middle ear space was entered.  There was a very prominent scutum and it encases portions of the chorda tympani nerve.  The scutum was curetted, leaving the chorda tympani nerve intact and the middle ear space visualized, with the aforementioned findings including otosclerosis around the stapes, on the promontory and around the round window in  portions.  I confirmed that the malleus and incus had normal mobility.  The stapes was completely fixed.  The distance from the footplate to the undersurface of the incus was 4 mm.  The incudostapedial joint was .  The Diode laser was used to vaporize the stapedial tendon and then to separate the attachment of the posterior rosina to the footplate.  The superstructure was then down fractured.  Given the involvement of otosclerosis of the anterior rosina, the posterior superstructure was removed with the capitulum and then additional lasering was performed on the anterior rosina to remove this segment as well.  The footplate was visualized.  The otosclerotic disease completely grew over the inferior 1/3 of the footplate and thus there was no way to see the annular ligament in this region.  I lasered on this area methodically and in a serial fashion with the laser and then scraped the bright white otosclerotic bone with the Hudson rasp.  This was performed until the footplate could be better visualized.  I did not create or open the annular ligament inferiorly purposefully to avoid entrance into the cochlea itself.  The laser was then used to incise the footplate at the junction of the anterior half and posterior half.  After lasering, the house hook was used to fenestrate this region and the Hudson rasp to remove the posterior footplate.  The footplate is not perfectly thin; it is slightly thicker than normal and the footplate was then reflected posteriorly and that portion was left just superior to the pyramidal eminence as it was affixed by some mucosal bands.  It appeared that it would be traumatic to completely remove it from that region.  The fascia graft was then placed over this; it is sizable.  The 4 mm prosthesis was placed on the fascia graft and brought up onto the lenticular process and the bucket handle brought up onto the long process of the incus.  It was palpated.  I adjusted it slightly by pushing  the prosthesis tip posteriorly a bit and then with palpation, there was a nice motion.  I could not achieve a good round window reflex, given the very prominent niche with some fluid in it.  I could appreciate that there was modest motion, though, with palpation of the prosthesis and the prosthesis appeared to be moving well, suggesting that there was an appropriate site.  At the same time, we will be mindful that there is prominent otosclerotic disease just inferior to the prosthesis track, given the involvement of the superior aspect of the promontory, and thus, the prosthesis needs to tilt slightly superiorly.  I flattened out the fascia graft carefully over the facial nerve anteriorly and posteriorly over the pyramidal eminence and down onto the top of the promontory and then straightened out the tympanomeatal flap, which was confirmed to be intact.  Ciprodex-soaked Gelfoams were used to secure this in the ear canal.  A cotton ball was placed with a Band-Aid.  Following this, we noticed there was a bit of oozing from the skin around the chromic stitches.  We investigated this and blood did not appear to be welling up underneath the incision; therefore, we placed Steri-Strips and then placed 2 cotton balls and some tape to put some light pressure on that supra-auricular incision site.  He was turned back over to Anesthesia.  He did have coughing during the period in between the conclusion of the procedure and full extubation.      I, Mira Kahn, the attending physician, was present and performed all key portions of this procedure.         MIRA KAHN MD             D: 2017 14:16   T: 2017 14:59   MT: al      Name:     SOLO VILLAR   MRN:      [id number]        Account:        [id number]   :      1955           Procedure Date: 2017      Document: [id number]

## 2017-05-31 NOTE — OR NURSING
Patient lethargic and nauseated.  Anesthesia asessed patient.  Given zofran 4 mg in OR and 4 mg in phase 1.  Given  mL in OR and 1050 mL in phase 1 and 2.  2 L NC applied for nausea.  Emesis x 2. Vital signs stable. At 12:30, patient feeling much more awake, satting well on room air.  No nausea.  Able to stand without nausea, only mild pain in L ear.  Reviewed discharged instructions and sent home with meds.

## 2017-05-31 NOTE — DISCHARGE INSTRUCTIONS
[facility]  Home Care Following Anesthesia  For 24 hours after surgery:  1. Get plenty of rest.  A responsible adult must stay with you for at least 24 hours after you leave the surgery center.  2. Do not drive or use heavy equipment.  If you have weakness or tingling, don't drive or use heavy equipment until this feeling goes away.   3. Do not drink alcohol.   4. Avoid strenuous or risky activities.  Ask for help when climbing stairs.  5. You may feel lightheaded.  IF so, sit for a few minutes before standing.  Have someone help you get up.   6. If you have nausea (feel sick to your stomach): Drink only clear liquids such as apple juice, ginger ale, broth or 7-Up.  Rest may also help.  Be sure to drink enough fluids.  Move to a regular diet as you feel able.   7. You may have a slight fever.  Call the doctor if your fever is over 100 F (37.7 C) (taken under the tongue) or lasts longer than 24 hours.  8. You may have a dry mouth, a sore throat, muscle aches or trouble sleeping. These should go away after 24 hours.  9. Do not make important or legal decisions.       Tips for taking pain medications  To get the best pain relief possible, remember these points:    Take pain medications as directed, before pain becomes severe.    Pain medication can upset your stomach: taking it with food may help.    Constipation is a common side effect of pain medication. Drink plenty of  fluids.    Eat foods high in fiber. Take a stool softener if recommended by your doctor or pharmacist.    Do not drink alcohol, drive or operate machinery while taking pain medications.    Ask about other ways to control pain, such as with heat, ice or relaxation.    Call a doctor for any of the followin. Signs of infection (fever, growing tenderness at the surgery site, a large amount of drainage or bleeding, severe pain, foul-smelling drainage, redness, swelling).  2. It has been over 8 to 10 hours since  "surgery and you are still not able to urinate (pass water).  3. Headache for over 24 hours.    Your doctor is:  Dr. Mira Kahn, ENT Otolaryngology: [phone]                  Or dial [phone] and ask for the resident on call for:  ENT Otolaryngology  For emergency care, call the:  [facility]:  [phone] (TTY for hearing impaired: [phone])    1. Medications:  Resume your home medications. Take pain medications when needed as  indicated. Finish all of your oral antibiotics. Take steroids as  prescribed. Use docusate to avoid  constipation. If Dr. Kahn gave you ear drops, bring them to your  post-operative appointment- no need to use them until then.    2. Wound care:  * Leave the cotton ball and bandaid in place for 48 hours.  * Leave the inside ear canal packing in place until you see Dr. Kahn.  * Leave the steri strips (bandaids) in place- they will fall off on their  own in 10-12 days- you may trim the edges as they loosen.    3. No shower until 48 hours after surgery. It is best to shower with a  shower cap in place and then use a cup to wash hair over the sink as to not  let your incision be soaked with water for the first few 5 days. Keep  incision clean and dry, do not scrub. Use a cotton ball coated with  Vaseline only on the outside of the cotton ball to keep water out of ear canal.    4. For the next 6 weeks:  * No heavy lifting more than 15 pounds.  * No strenuous activities.  * No nose blowing.  * Sneeze with your mouth open.  * No airplane flights.    5. Please call MD or come to the ED for shortness of breath, trouble  breathing, inability to tolerate liquids, intractable dizziness, or signs  of infection such as fevers or redness.    Call the [phone] clinic number if you have any questions and concerns  during the date and call [phone] at night and ask for \"ENT resident on  call\".    6. Follow up with Dr. Kahn as previously scheduled in 3 " weeks

## 2017-05-31 NOTE — ANESTHESIA PREPROCEDURE EVALUATION
Anesthesia Evaluation     . Pt has had prior anesthetic.     No history of anesthetic complications          ROS/MED HX    ENT/Pulmonary:  - neg pulmonary ROS     Neurologic:  - neg neurologic ROS     Cardiovascular:     (+) hypertension----. : . . . :. .       METS/Exercise Tolerance:  >4 METS   Hematologic:  - neg hematologic  ROS       Musculoskeletal:  - neg musculoskeletal ROS       GI/Hepatic:  - neg GI/hepatic ROS       Renal/Genitourinary:  - ROS Renal section negative       Endo:  - neg endo ROS       Psychiatric:  - neg psychiatric ROS       Infectious Disease:  - neg infectious disease ROS       Malignancy:      - no malignancy   Other:    - neg other ROS                 Physical Exam  Normal systems: dental    Airway   Mallampati: II  TM distance: >3 FB  Neck ROM: full    Dental     Cardiovascular   Rhythm and rate: regular      Pulmonary    breath sounds clear to auscultation                    Anesthesia Plan      History & Physical Review  History and physical reviewed and following examination; no interval change.    ASA Status:  2 .    NPO Status:  > 8 hours    Plan for General and ETT with Intravenous induction. Maintenance will be Balanced.    PONV prophylaxis:  Ondansetron (or other 5HT-3)       Postoperative Care  Postoperative pain management:  IV analgesics and Oral pain medications.      Consents  Anesthetic plan, risks, benefits and alternatives discussed with:  Patient..                          .

## 2017-05-31 NOTE — OR NURSING
Dr. Kahn assessed patient in PACU x2.  Patient very somnolent during assessment.  Does arouse to voice, but drifts quickly back to sleep.  MD will check back.

## 2017-05-31 NOTE — IP AVS SNAPSHOT
MRN:[id number]                      After Visit Summary   5/31/2017    Jeffy Machado    MRN: [id number]           Thank you!     Thank you for choosing [facility] for your care. Our goal is always to provide you with excellent care. Hearing back from our patients is one way we can continue to improve our services. Please take a few minutes to complete the written survey that you may receive in the mail after you visit with us. Thank you!        Patient Information     Date Of Birth          1955        About your hospital stay     You were admitted on:  May 31, 2017 You last received care in the:  [facility]    You were discharged on:  May 31, 2017       Who to Call     For medical emergencies, please call 911.  For non-urgent questions about your medical care, please call your primary care provider or clinic, [phone]  For questions related to your surgery, please call your surgery clinic        Attending Provider     Provider Specialty    Mira Kahn MD Otolaryngology       Primary Care Provider Office Phone # Fax #    Marcello Lopez [phone]      After Care Instructions     Diet Instructions       Resume pre procedure diet                  Your next 10 appointments already scheduled     Jun 23, 2017 10:30 AM CDT   (Arrive by 10:15 AM)   Return Visit with Mira Kahn MD   [facility] ([facility])    [address] [place] [id number] [phone]              Further instructions from your care team       [facility]  Home Care Following Anesthesia  For 24 hours after surgery:  1. Get plenty of rest.  A responsible adult must stay with you for at least 24 hours after you leave the surgery center.  2. Do not drive or use heavy equipment.  If you have weakness or tingling, don't drive or use heavy equipment until this  feeling goes away.   3. Do not drink alcohol.   4. Avoid strenuous or risky activities.  Ask for help when climbing stairs.  5. You may feel lightheaded.  IF so, sit for a few minutes before standing.  Have someone help you get up.   6. If you have nausea (feel sick to your stomach): Drink only clear liquids such as apple juice, ginger ale, broth or 7-Up.  Rest may also help.  Be sure to drink enough fluids.  Move to a regular diet as you feel able.   7. You may have a slight fever.  Call the doctor if your fever is over 100 F (37.7 C) (taken under the tongue) or lasts longer than 24 hours.  8. You may have a dry mouth, a sore throat, muscle aches or trouble sleeping. These should go away after 24 hours.  9. Do not make important or legal decisions.       Tips for taking pain medications  To get the best pain relief possible, remember these points:    Take pain medications as directed, before pain becomes severe.    Pain medication can upset your stomach: taking it with food may help.    Constipation is a common side effect of pain medication. Drink plenty of  fluids.    Eat foods high in fiber. Take a stool softener if recommended by your doctor or pharmacist.    Do not drink alcohol, drive or operate machinery while taking pain medications.    Ask about other ways to control pain, such as with heat, ice or relaxation.    Call a doctor for any of the followin. Signs of infection (fever, growing tenderness at the surgery site, a large amount of drainage or bleeding, severe pain, foul-smelling drainage, redness, swelling).  2. It has been over 8 to 10 hours since surgery and you are still not able to urinate (pass water).  3. Headache for over 24 hours.    Your doctor is:  Dr. Mira Kahn, ENT Otolaryngology: [phone]                  Or dial [phone] and ask for the resident on call for:  ENT Otolaryngology  For emergency care, call the:  [facility]:  [phone] (TTY for hearing  "impaired: [phone])    1. Medications:  Resume your home medications. Take pain medications when needed as  indicated. Finish all of your oral antibiotics. Take steroids as  prescribed. Use docusate to avoid  constipation. If Dr. Kahn gave you ear drops, bring them to your  post-operative appointment- no need to use them until then.    2. Wound care:  * Leave the cotton ball and bandaid in place for 48 hours.  * Leave the inside ear canal packing in place until you see Dr. Kahn.  * Leave the steri strips (bandaids) in place- they will fall off on their  own in 10-12 days- you may trim the edges as they loosen.    3. No shower until 48 hours after surgery. It is best to shower with a  shower cap in place and then use a cup to wash hair over the sink as to not  let your incision be soaked with water for the first few 5 days. Keep  incision clean and dry, do not scrub. Use a cotton ball coated with  Vaseline only on the outside of the cotton ball to keep water out of ear canal.    4. For the next 6 weeks:  * No heavy lifting more than 15 pounds.  * No strenuous activities.  * No nose blowing.  * Sneeze with your mouth open.  * No airplane flights.    5. Please call MD or come to the ED for shortness of breath, trouble  breathing, inability to tolerate liquids, intractable dizziness, or signs  of infection such as fevers or redness.    Call the [phone] clinic number if you have any questions and concerns  during the date and call [phone] at night and ask for \"ENT resident on  call\".    6. Follow up with Dr. Kahn as previously scheduled in 3 weeks              Pending Results     No orders found from 5/29/2017 to 6/1/2017.            Admission Information     Date & Time Provider Department Dept. Phone    5/31/2017 Mira Kahn MD [facility] [phone]      Your Vitals Were     Blood Pressure Pulse Temperature Respirations Height Weight    111/71 47 97.2  F (36.2 " " C) (Temporal) 22 1.778 m (5' 10\") 95.3 kg (210 lb)    Pulse Oximetry BMI (Body Mass Index)                94% 30.13 kg/m2          [employer] Information     [employer] gives you secure access to your electronic health record. If you see a primary care provider, you can also send messages to your care team and make appointments. If you have questions, please call your primary care clinic.  If you do not have a primary care provider, please call [phone] and they will assist you.      [employer] is an electronic gateway that provides easy, online access to your medical records. With [employer], you can request a clinic appointment, read your test results, renew a prescription or communicate with your care team.     To access your existing account, please contact your [facility] or call [phone] for assistance.        Care EveryWhere ID     This is your Care EveryWhere ID. This could be used by other organizations to access your [facility] medical records  [id number]           Review of your medicines      START taking        Dose / Directions    cefuroxime 250 MG tablet   Commonly known as:  CEFTIN   Used for:  Otosclerosis, left        Dose:  250 mg   Take 1 tablet (250 mg) by mouth 2 times daily for 7 days   Quantity:  14 tablet   Refills:  0       HYDROcodone-acetaminophen 5-325 MG per tablet   Commonly known as:  NORCO   Used for:  Postoperative pain        Dose:  1-2 tablet   Take 1-2 tablets by mouth every 6 hours as needed for moderate to severe pain maximum 8 tablet(s) per day   Quantity:  30 tablet   Refills:  0       predniSONE 10 MG tablet   Commonly known as:  DELTASONE   Used for:  Otosclerosis, left        Take 4 tabs for 3 days, then take 3 tabs for 2 days, then 2 tabs for 2 days, then 1 tab for 2 days   Quantity:  24 tablet   Refills:  0         CONTINUE these medicines which have NOT CHANGED        Dose / Directions    ATENOLOL PO        Dose:  25 mg   Take 25 mg by mouth " daily   Refills:  0       potassium chloride 20 MEQ Packet   Commonly known as:  KLOR-CON        Dose:  20 mEq   Take 20 mEq by mouth 2 times daily   Refills:  0            Where to get your medicines      These medications were sent to [place], [place] - [address]  [address] [address]    Hours:  TRANSPLANT PHONE NUMBER [phone] Phone:  [phone]     cefuroxime 250 MG tablet    predniSONE 10 MG tablet         Some of these will need a paper prescription and others can be bought over the counter. Ask your nurse if you have questions.     Bring a paper prescription for each of these medications     HYDROcodone-acetaminophen 5-325 MG per tablet                Protect others around you: Learn how to safely use, store and throw away your medicines at www.disposemymeds.org.             Medication List: This is a list of all your medications and when to take them. Check marks below indicate your daily home schedule. Keep this list as a reference.      Medications           Morning Afternoon Evening Bedtime As Needed    ATENOLOL PO   Take 25 mg by mouth daily                                cefuroxime 250 MG tablet   Commonly known as:  CEFTIN   Take 1 tablet (250 mg) by mouth 2 times daily for 7 days                                HYDROcodone-acetaminophen 5-325 MG per tablet   Commonly known as:  NORCO   Take 1-2 tablets by mouth every 6 hours as needed for moderate to severe pain maximum 8 tablet(s) per day                                potassium chloride 20 MEQ Packet   Commonly known as:  KLOR-CON   Take 20 mEq by mouth 2 times daily                                predniSONE 10 MG tablet   Commonly known as:  DELTASONE   Take 4 tabs for 3 days, then take 3 tabs for 2 days, then 2 tabs for 2 days, then 1 tab for 2 days

## 2017-05-31 NOTE — BRIEF OP NOTE
[facility]    Brief Operative Note    Pre-operative diagnosis: Left Otosclerosis  Post-operative diagnosis Same  Procedure: Procedure(s):  Diode Laser Left Stapedectomy, Middle Ear Exploration - Wound Class: II-Clean Contaminated  Surgeon: Surgeon(s) and Role:     * Mira Kahn MD - Primary  Anesthesia: General   Estimated blood loss: Less than 10 ml  Drains: None  Specimens: * No specimens in log *  Findings:   None. See dictated note for full details. Mobile lateral ossicular chain. Footplate fixed. Obliterative disease involving the anterior rosina.   Complications: None.  Implants: 4 mm bucket handle stapes prosthesis.

## 2017-05-31 NOTE — ANESTHESIA CARE TRANSFER NOTE
Patient: Jeffy Machado    Procedure(s):  Diode Laser Left Stapedectomy, Middle Ear Exploration - Wound Class: II-Clean Contaminated    Diagnosis: Left Otosclerosis  Diagnosis Additional Information: No value filed.    Anesthesia Type:   General, ETT     Note:  Airway :Room Air  Patient transferred to:PACU  Comments: Arrive PACU, Stable, Airway Intact  111/71, 68,16,94,36.2  All questions answered.      Vitals: (Last set prior to Anesthesia Care Transfer)    CRNA VITALS  5/31/2017 0957 - 5/31/2017 1033      5/31/2017             EKG: NSR                Electronically Signed By: ARINA Moran CRNA  May 31, 2017  10:33 AM

## 2017-05-31 NOTE — PROGRESS NOTES
"Post insertion of PIV, Pt. Fainted. Oxygen applied, head of bed laid flat, and Pt. Hooked up to monitor. Dr. Lee and Musa RN assisted at bedside to get Pt awake. VSS, see flow sheet. O2 on at 5 L, O2 sats 99% then weaned to RA and sats remained 99%. Pt. A&O x4, VSS, states he felt \"woozy\" during IV insertion.   "

## 2017-05-31 NOTE — ANESTHESIA POSTPROCEDURE EVALUATION
Patient: Jeffy Machado    Procedure(s):  Diode Laser Left Stapedectomy, Middle Ear Exploration - Wound Class: II-Clean Contaminated    Diagnosis:Left Otosclerosis  Diagnosis Additional Information: No value filed.    Anesthesia Type:  General, ETT    Note:  Anesthesia Post Evaluation    Patient location during evaluation: PACU  Patient participation: Able to fully participate in evaluation  Level of consciousness: awake  Pain management: adequate  Airway patency: patent  Cardiovascular status: acceptable  Respiratory status: acceptable  Hydration status: acceptable  PONV: none     Anesthetic complications: None          Last vitals:  Vitals:    05/31/17 1030 05/31/17 1045 05/31/17 1100   BP: 111/71 109/77 107/72   Pulse:      Resp: 22 15 14   Temp: 36.2  C (97.2  F)  36  C (96.8  F)   SpO2: 94% 95% 95%         Electronically Signed By: Reji Quevedo MD  May 31, 2017  11:02 AM

## 2017-06-23 ENCOUNTER — OFFICE VISIT (OUTPATIENT)
Dept: OTOLARYNGOLOGY | Facility: CLINIC | Age: 62
End: 2017-06-23

## 2017-06-23 DIAGNOSIS — H80.92 OTOSCLEROSIS, LEFT: Primary | ICD-10-CM

## 2017-06-23 NOTE — LETTER
6/23/2017       RE: Jeffy Machado  [address] [address]     Dear Colleague,    Thank you for referring your patient, Jeffy Machado, to the [facility] EAR NOSE AND THROAT at [facility]. Please see a copy of my visit note below.    Jeffy Machado returns for his first post op follow up visit after left partial stapedectomy on May 31, 2017.  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  He reports that he notices an improvement in hearing.  He can hear the phone louder than before.  He does not have any dizziness, tinnitus, or taste change.    Physical exam:  Incision has healed.  Packing removed.  Tympanomeatal flap seated.  512 Hz TF lateralizes to left ear.    Impression/plan:  Healing as expected after left stapes procedure.  Keep ear dry.  Reviewed activity restrictions.  Follow up in 6 weeks with audiogram.    Again, thank you for allowing me to participate in the care of your patient.      Sincerely,    Mira Kahn MD

## 2017-06-23 NOTE — MR AVS SNAPSHOT
After Visit Summary   6/23/2017    Jeffy Machado    MRN: [id number]           Patient Information     Date Of Birth          1955        Visit Information        Provider Department      6/23/2017 10:30 AM Mira Kahn MD M [facility] Ear Nose and Throat        Care Instructions    1.  Patient to follow up in the ENT clinic in 6 weeks with an audiogram.  2.  Patient to call the clinic sooner with questions or concerns: [phone], option #3.            Follow-ups after your visit        Your next 10 appointments already scheduled     Aug 15, 2017 10:00 AM CDT   Walk In From ENT with Deandre Gonzalez [facility] ([facility])    [address] [place] [address] [phone]            Aug 15, 2017 11:10 AM CDT   (Arrive by 10:55 AM)   Return Visit with MD WOLF Welch [facility] Ear Nose and Throat ([facility])    [address] [place] [address] [phone]              Who to contact     Please call your clinic at [phone] to:    Ask questions about your health    Make or cancel appointments    Discuss your medicines    Learn about your test results    Speak to your doctor   If you have compliments or concerns about an experience at your clinic, or if you wish to file a complaint, please contact [facility] Phys[facility] Patient Relations at [phone] or email us at [email or web address]         Additional Information About Your Visit        Carmudihart Information     FaceOn Mobile gives you secure access to your electronic health record. If you see a primary care provider, you can also send messages to your care team and make appointments. If you have questions, please call your primary care clinic.  If you do not have a primary care provider, please call [phone] and they will assist you.      FaceOn Mobile is an electronic gateway that  provides easy, online access to your medical records. With Cinpost, you can request a clinic appointment, read your test results, renew a prescription or communicate with your care team.     To access your existing account, please contact your [facility] or call [phone] for assistance.        Care EveryWhere ID     This is your Care EveryWhere ID. This could be used by other organizations to access your [place] medical records  [id number]         Blood Pressure from Last 3 Encounters:   05/31/17 113/74    Weight from Last 3 Encounters:   05/31/17 95.3 kg (210 lb)   05/02/17 97.1 kg (214 lb)   02/21/17 94.3 kg (208 lb)              Today, you had the following     No orders found for display       Primary Care Provider Office Phone # Fax #    Marcello Lopez [phone]       [facility] [address] [facility] [address]        Equal Access to Services     RUDDY YANG : Hadii aad ku hadasho Soomaali, waaxda luqadaha, qaybta kaalmada adeegyada, waxay misbahin hayjosefinan delfino arriaza . So [facility] [phone].    ATENCIÓN: Si habla español, tiene a cloud disposición servicios gratuitos de asistencia lingüística. Llame al [phone].    We comply with applicable federal civil rights laws and [place] laws. We do not discriminate on the basis of race, color, national origin, age, disability sex, sexual orientation or gender identity.            Thank you!     Thank you for choosing [facility] EAR NOSE AND THROAT  for your care. Our goal is always to provide you with excellent care. Hearing back from our patients is one way we can continue to improve our services. Please take a few minutes to complete the written survey that you may receive in the mail after your visit with us. Thank you!             Your Updated Medication List - Protect others around you: Learn how to safely use, store and throw away your medicines at www.disposemymeds.org.          This  list is accurate as of: 6/23/17 11:06 AM.  Always use your most recent med list.                   Brand Name Dispense Instructions for use Diagnosis    ATENOLOL PO      Take 25 mg by mouth daily        HYDROcodone-acetaminophen 5-325 MG per tablet    NORCO    30 tablet    Take 1-2 tablets by mouth every 6 hours as needed for moderate to severe pain maximum 8 tablet(s) per day    Postoperative pain       potassium chloride 20 MEQ Packet    KLOR-CON     Take 20 mEq by mouth 2 times daily        predniSONE 10 MG tablet    DELTASONE    24 tablet    Take 4 tabs for 3 days, then take 3 tabs for 2 days, then 2 tabs for 2 days, then 1 tab for 2 days    Otosclerosis, left

## 2017-06-23 NOTE — PATIENT INSTRUCTIONS
1.  Patient to follow up in the ENT clinic in 6 weeks with an audiogram.  2.  Patient to call the clinic sooner with questions or concerns: [phone], option #3.

## 2017-07-31 NOTE — PROGRESS NOTES
Jeffy Machado returns for his first post op follow up visit after left partial stapedectomy on May 31, 2017.  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  He reports that he notices an improvement in hearing.  He can hear the phone louder than before.  He does not have any dizziness, tinnitus, or taste change.    Physical exam:  Incision has healed.  Packing removed.  Tympanomeatal flap seated.  512 Hz TF lateralizes to left ear.    Impression/plan:  Healing as expected after left stapes procedure.  Keep ear dry.  Reviewed activity restrictions.  Follow up in 6 weeks with audiogram.

## 2017-08-11 DIAGNOSIS — H80.92 OTOSCLEROSIS OF LEFT EAR: Primary | ICD-10-CM

## 2017-08-15 ENCOUNTER — OFFICE VISIT (OUTPATIENT)
Dept: OTOLARYNGOLOGY | Facility: CLINIC | Age: 62
End: 2017-08-15

## 2017-08-15 ENCOUNTER — OFFICE VISIT (OUTPATIENT)
Dept: AUDIOLOGY | Facility: CLINIC | Age: 62
End: 2017-08-15

## 2017-08-15 VITALS — HEIGHT: 70 IN | WEIGHT: 214 LBS | BODY MASS INDEX: 30.64 KG/M2

## 2017-08-15 DIAGNOSIS — H90.A21 SENSORINEURAL HEARING LOSS (SNHL) OF RIGHT EAR WITH RESTRICTED HEARING OF LEFT EAR: ICD-10-CM

## 2017-08-15 DIAGNOSIS — H90.A32 MIXED CONDUCTIVE AND SENSORINEURAL HEARING LOSS OF LEFT EAR WITH RESTRICTED HEARING OF RIGHT EAR: ICD-10-CM

## 2017-08-15 DIAGNOSIS — H80.92 OTOSCLEROSIS OF LEFT EAR: Primary | ICD-10-CM

## 2017-08-15 ASSESSMENT — PAIN SCALES - GENERAL: PAINLEVEL: NO PAIN (0)

## 2017-08-15 NOTE — MR AVS SNAPSHOT
After Visit Summary   8/15/2017    Jeffy Machado    MRN: [id number]           Patient Information     Date Of Birth          1955        Visit Information        Provider Department      8/15/2017 11:10 AM Mira Kahn MD M [facility] Ear Nose and Throat         Follow-ups after your visit        Your next 10 appointments already scheduled     Aug 14, 2018 10:00 AM CDT   Walk In From ENT with Deandre Gonzalez [facility]iology ([facility])    [address] [place] [address] [phone]            Aug 14, 2018 11:10 AM CDT   (Arrive by 10:55 AM)   Return Visit with MD WOLF Welch [facility] Ear Nose and Throat ([facility])    [address] [place] [address] [phone]              Who to contact     Please call your clinic at [phone] to:    Ask questions about your health    Make or cancel appointments    Discuss your medicines    Learn about your test results    Speak to your doctor   If you have compliments or concerns about an experience at your clinic, or if you wish to file a complaint, please contact [facility] Physicians Patient Relations at [phone] or email us at [email or web address]         Additional Information About Your Visit        MyChart Information     L4 Mobilet gives you secure access to your electronic health record. If you see a primary care provider, you can also send messages to your care team and make appointments. If you have questions, please call your primary care clinic.  If you do not have a primary care provider, please call [phone] and they will assist you.      TinyCircuits is an electronic gateway that provides easy, online access to your medical records. With TinyCircuits, you can request a clinic appointment, read your test results, renew a prescription or communicate with your care team.     To access  "your existing account, please contact your [facility] or call [phone] for assistance.        Care EveryWhere ID     This is your Care EveryWhere ID. This could be used by other organizations to access your [place] medical records  [id number]        Your Vitals Were     Height BMI (Body Mass Index)                1.78 m (5' 10.08\") 30.64 kg/m2           Blood Pressure from Last 3 Encounters:   05/31/17 113/74    Weight from Last 3 Encounters:   08/15/17 97.1 kg (214 lb)   05/31/17 95.3 kg (210 lb)   05/02/17 97.1 kg (214 lb)              Today, you had the following     No orders found for display       Primary Care Provider Office Phone # Fax #    Marcello Lopez [phone]       [facility] [address] [facility] [address]        Equal Access to Services     RUDDY YANG : Hadii aad ku hadasho Soomaali, waaxda luqadaha, qaybta kaalmada adeegyada, waxay misbahin hayjosefinan delfino arriaza . So [facility] [phone].    ATENCIÓN: Si habla español, tiene a cloud disposición servicios gratuitos de asistencia lingüística. Rd al [phone].    We comply with applicable federal civil rights laws and [place] laws. We do not discriminate on the basis of race, color, national origin, age, disability sex, sexual orientation or gender identity.            Thank you!     Thank you for choosing [facility] EAR NOSE AND THROAT  for your care. Our goal is always to provide you with excellent care. Hearing back from our patients is one way we can continue to improve our services. Please take a few minutes to complete the written survey that you may receive in the mail after your visit with us. Thank you!             Your Updated Medication List - Protect others around you: Learn how to safely use, store and throw away your medicines at www.disposemymeds.org.          This list is accurate as of: 8/15/17 12:01 PM.  Always use your most recent med list.                "    Brand Name Dispense Instructions for use Diagnosis    ATENOLOL PO      Take 25 mg by mouth daily

## 2017-08-15 NOTE — LETTER
8/15/2017       RE: Jeffy Machado  [address] [address]     Dear Colleague,    Thank you for referring your patient, Jeffy Machado, to the [facility] EAR NOSE AND THROAT at [facility]. Please see a copy of my visit note below.    Sean Coyne MD   [facility]    [address] [place]  [address]      Dear. Dr. Coyne,       I had the pleasure of seeing Jeffy Machado today in followup.        HISTORY OF PRESENT ILLNESS:  He has had a left stapedectomy performed on 5/31/2017.   At the time of surgery, I found obliterative otosclerosis.  He had a maximal conductive hearing loss.  He noticed a significant improvement in his hearing. For awhile, sounds seemed too loud in the left ear, but now it has simply become his new normal.  He is pleased with it.      PHYSICAL EXAMINATION:  On examination, he appeared well. The tympanomeatal flap is completely healed and the tympanic membrane is in good position and the middle ear space is well aerated. There are similar findings on the right side except it is nonsurgical.       AUDIOGRAM:  An audiogram was performed. Right SRT is now 25dB with 100% WRS.  It was 70 dB pre-op. Thresholds have improved by 45 dB. There is now a 10 dB gap between the right and left ear air conduction thresholds in the speech frequencies.      IMPRESSION AND PLAN:  It is my impression that Mr. Machado has had an appropriate hearing improvement following partial stapedectomy for obliterative otosclerosis.  We discussed that we would recommend that he have his audiogram checked again in one year to assess stability, but certainly if he notes any changes in his hearing prior to that, I would be happy to see him sooner.  He does not have specific activity restrictions, although I do advise significant caution against scuba diving, but he indicated this was not an interest of his.  I will be happy to see him back at any time, and  one year with an audiogram will be scheduled.      Thank you again for allowing me to participate in his care.      Sincerely,      Mira Kahn M.D.    Neurotology    [phone]          MA/ms

## 2017-08-15 NOTE — PROGRESS NOTES
AUDIOLOGY REPORT    SUMMARY: Audiology visit completed. See audiogram for results.      RECOMMENDATIONS: Follow-up with ENT.      Sasha Huerta, CCC-A  Licensed Audiologist  [place] #[id number]

## 2017-08-15 NOTE — PROGRESS NOTES
Sean Coyne MD   [facility]    [address] [place]  [address]      Dear. Dr. Coyne,       I had the pleasure of seeing Jeffy Machado today in followup.        HISTORY OF PRESENT ILLNESS:  He has had a left stapedectomy performed on 5/31/2017.   At the time of surgery, I found obliterative otosclerosis.  He had a maximal conductive hearing loss.  He noticed a significant improvement in his hearing. For awhile, sounds seemed too loud in the left ear, but now it has simply become his new normal.  He is pleased with it.      PHYSICAL EXAMINATION:  On examination, he appeared well. The tympanomeatal flap is completely healed and the tympanic membrane is in good position and the middle ear space is well aerated. There are similar findings on the right side except it is nonsurgical.       AUDIOGRAM:  An audiogram was performed. Right SRT is now 25dB with 100% WRS.  It was 70 dB pre-op. Thresholds have improved by 45 dB. There is now a 10 dB gap between the right and left ear air conduction thresholds in the speech frequencies.      IMPRESSION AND PLAN:  It is my impression that Mr. Machado has had an appropriate hearing improvement following partial stapedectomy for obliterative otosclerosis.  We discussed that we would recommend that he have his audiogram checked again in one year to assess stability, but certainly if he notes any changes in his hearing prior to that, I would be happy to see him sooner.  He does not have specific activity restrictions, although I do advise significant caution against scuba diving, but he indicated this was not an interest of his.  I will be happy to see him back at any time, and one year with an audiogram will be scheduled.      Thank you again for allowing me to participate in his care.      Sincerely,      Mira Kahn M.D.    Neurotology    [phone]          MA/ms

## 2017-08-15 NOTE — PATIENT INSTRUCTIONS
RTC one year with audio.    Please call/contact with further questions/concerns.     Sincerely,    TOÑO Alvarado R.N.    ENT appointment scheduling [phone] option 1  ENT Triage Team  [phone] option 3  ENT  Fax: [phone]     Concerns for after clinic hours/weekend, that can not wait until clinic is open, [phone] option 4 ask for the ENT resident on call.     Address: [facility]  ENT clinic    [address] [place] [address]   4th Floor

## 2017-08-15 NOTE — MR AVS SNAPSHOT
After Visit Summary   8/15/2017    Jeffy Machado    MRN: [id number]           Patient Information     Date Of Birth          1955        Visit Information        Provider Department      8/15/2017 10:00 AM Bonnie Sotelo AuD M [facility] Audiology        Today's Diagnoses     Mixed conductive and sensorineural hearing loss of left ear with restricted hearing of right ear        Sensorineural hearing loss (SNHL) of right ear with restricted hearing of left ear           Follow-ups after your visit        Who to contact     Please call your clinic at [phone] to:    Ask questions about your health    Make or cancel appointments    Discuss your medicines    Learn about your test results    Speak to your doctor   If you have compliments or concerns about an experience at your clinic, or if you wish to file a complaint, please contact [facility] Patient Relations at [phone] or email us at [email or web address]         Additional Information About Your Visit        MyChart Information     Pumantt gives you secure access to your electronic health record. If you see a primary care provider, you can also send messages to your care team and make appointments. If you have questions, please call your primary care clinic.  If you do not have a primary care provider, please call [phone] and they will assist you.      GlossyBox is an electronic gateway that provides easy, online access to your medical records. With GlossyBox, you can request a clinic appointment, read your test results, renew a prescription or communicate with your care team.     To access your existing account, please contact your [facility] or call [phone] for assistance.        Care EveryWhere ID     This is your Care EveryWhere ID. This could be used by other organizations to access your [place] medical records  [id number]         Blood Pressure from Last 3  Encounters:   05/31/17 113/74    Weight from Last 3 Encounters:   08/15/17 97.1 kg (214 lb)   05/31/17 95.3 kg (210 lb)   05/02/17 97.1 kg (214 lb)              We Performed the Following     AUDIOGRAM/TYMPANOGRAM - INTERFACE     [facility] Audiometry Thrshld Eval & Speech Recog ([address])     Kat   ([id number])     Tymps / Reflex   ([id number])        Primary Care Provider Office Phone # Fax #    Marcello Lopez [phone]       [facility] [address] [facility] [address]        Equal Access to Services     [facility] : Hadii aad ku hadasho Soomaali, waaxda luqadaha, qaybta kaalmada adeegyada, arelis barlow. So [facility] [phone].    ATENCIÓN: Si habla español, tiene a cloud disposición servicios gratuitos de asistencia lingüística. Llame al [phone].    We comply with applicable federal civil rights laws and [place] laws. We do not discriminate on the basis of race, color, national origin, age, disability sex, sexual orientation or gender identity.            Thank you!     Thank you for choosing [facility] AUDIOLOGY  for your care. Our goal is always to provide you with excellent care. Hearing back from our patients is one way we can continue to improve our services. Please take a few minutes to complete the written survey that you may receive in the mail after your visit with us. Thank you!             Your Updated Medication List - Protect others around you: Learn how to safely use, store and throw away your medicines at www.disposemymeds.org.          This list is accurate as of: 8/15/17 11:56 AM.  Always use your most recent med list.                   Brand Name Dispense Instructions for use Diagnosis    ATENOLOL PO      Take 25 mg by mouth daily

## 2017-10-04 ENCOUNTER — DOCUMENTATION ONLY (OUTPATIENT)
Dept: OTHER | Facility: CLINIC | Age: 62
End: 2017-10-04

## 2017-10-04 PROBLEM — Z71.89 ADVANCED DIRECTIVES, COUNSELING/DISCUSSION: Chronic | Status: ACTIVE | Noted: 2017-10-04

## 2018-08-14 ENCOUNTER — OFFICE VISIT (OUTPATIENT)
Dept: OTOLARYNGOLOGY | Facility: CLINIC | Age: 63
End: 2018-08-14
Payer: COMMERCIAL

## 2018-08-14 ENCOUNTER — OFFICE VISIT (OUTPATIENT)
Dept: AUDIOLOGY | Facility: CLINIC | Age: 63
End: 2018-08-14
Payer: COMMERCIAL

## 2018-08-14 VITALS — BODY MASS INDEX: 30.24 KG/M2 | WEIGHT: 216 LBS | HEIGHT: 71 IN

## 2018-08-14 DIAGNOSIS — H90.A21 SENSORINEURAL HEARING LOSS (SNHL) OF RIGHT EAR WITH RESTRICTED HEARING OF LEFT EAR: ICD-10-CM

## 2018-08-14 DIAGNOSIS — H80.92 OTOSCLEROSIS OF LEFT EAR: Primary | ICD-10-CM

## 2018-08-14 DIAGNOSIS — H90.A32 MIXED CONDUCTIVE AND SENSORINEURAL HEARING LOSS OF LEFT EAR WITH RESTRICTED HEARING OF RIGHT EAR: ICD-10-CM

## 2018-08-14 RX ORDER — ATENOLOL AND CHLORTHALIDONE TABLET 50; 25 MG/1; MG/1
TABLET ORAL
Refills: 1 | COMMUNITY
Start: 2018-05-24 | End: 2020-09-04

## 2018-08-14 ASSESSMENT — PAIN SCALES - GENERAL: PAINLEVEL: NO PAIN (0)

## 2018-08-14 NOTE — PROGRESS NOTES
AUDIOLOGY REPORT    SUMMARY: Audiology visit completed. See audiogram for results.      RECOMMENDATIONS: Follow-up with ENT.      Sasha Walsh, CCC-A  Licensed Audiologist  [place] #[id number]

## 2018-08-14 NOTE — PATIENT INSTRUCTIONS
1. Please follow-up in clinic in 2 years with audiogram.   2. Please call the ENT clinic with any questions,concerns, new or worsening symptoms.    -Clinic number is [phone]   - Diana's direct line (Dr. Kahn' nurse) [phone]

## 2018-08-14 NOTE — MR AVS SNAPSHOT
After Visit Summary   8/14/2018    Jeffy Machado    MRN: [id number]           Patient Information     Date Of Birth          1955        Visit Information        Provider Department      8/14/2018 10:00 AM Mariana Tovar AuD [facility] Audiology        Today's Diagnoses     Sensorineural hearing loss (SNHL) of right ear with restricted hearing of left ear        Mixed conductive and sensorineural hearing loss of left ear with restricted hearing of right ear           Follow-ups after your visit        Your next 10 appointments already scheduled     Aug 14, 2018 11:10 AM CDT   (Arrive by 10:55 AM)   Return Visit with Mira Kahn MD   [facility] Ear Nose and Throat ([facility] [place])    [address]  [address] [address] [phone]              Who to contact     Please call your clinic at [phone] to:    Ask questions about your health    Make or cancel appointments    Discuss your medicines    Learn about your test results    Speak to your doctor            Additional Information About Your Visit        Jebbithar[employer] Information     [employer] gives you secure access to your electronic health record. If you see a primary care provider, you can also send messages to your care team and make appointments. If you have questions, please call your primary care clinic.  If you do not have a primary care provider, please call [phone] and they will assist you.      Geo[employer] is an electronic gateway that provides easy, online access to your medical records. With [employer], you can request a clinic appointment, read your test results, renew a prescription or communicate with your care team.     To access your existing account, please contact your [facility] or call [phone] for assistance.        Care EveryWhere ID     This is your Care EveryWhere ID. This could be used by other organizations to access your [place]  medical records  [id number]         Blood Pressure from Last 3 Encounters:   05/31/17 113/74    Weight from Last 3 Encounters:   08/15/17 97.1 kg (214 lb)   05/31/17 95.3 kg (210 lb)   05/02/17 97.1 kg (214 lb)              We Performed the Following     AUDIOGRAM/TYMPANOGRAM - INTERFACE     [facility] Audiometry Thrshld Eval & Speech Recog ([address]881)     Reduced 52 - Tymps / Reflex   ([id number])        Primary Care Provider Office Phone # Fax #    Marcello Lopez [phone]       [facility] [address] [address]        Equal Access to Services     RUDDY YANG : Hadii rosa wu hadasho Soomaali, waaxda luqadaha, qaybta kaalmada adeegyada, arelis barlow. So [facility] [phone].    ATENCIÓN: Si habla español, tiene a cloud disposición servicios gratuitos de asistencia lingüística. Llame al [phone].    We comply with applicable federal civil rights laws and [place] laws. We do not discriminate on the basis of race, color, national origin, age, disability, sex, sexual orientation, or gender identity.            Thank you!     Thank you for choosing [facility] AUDIOLOGY  for your care. Our goal is always to provide you with excellent care. Hearing back from our patients is one way we can continue to improve our services. Please take a few minutes to complete the written survey that you may receive in the mail after your visit with us. Thank you!             Your Updated Medication List - Protect others around you: Learn how to safely use, store and throw away your medicines at www.disposemymeds.org.          This list is accurate as of 8/14/18 10:15 AM.  Always use your most recent med list.                   Brand Name Dispense Instructions for use Diagnosis    ATENOLOL PO      Take 25 mg by mouth daily

## 2018-08-14 NOTE — MR AVS SNAPSHOT
"              After Visit Summary   8/14/2018    Jeffy Machado    MRN: [id number]           Patient Information     Date Of Birth          1955        Visit Information        Provider Department      8/14/2018 11:10 AM Mira Kahn MD [place] [facility]        Today's Diagnoses     Otosclerosis of left ear    -  1      Care Instructions    1. Please follow-up in clinic in 2 years with audiogram.   2. Please call the ENT clinic with any questions,concerns, new or worsening symptoms.    -Clinic number is [phone]   - Diana's direct line (Dr. Kahn' nurse) [phone]              Follow-ups after your visit        Who to contact     Please call your clinic at [phone] to:    Ask questions about your health    Make or cancel appointments    Discuss your medicines    Learn about your test results    Speak to your doctor            Additional Information About Your Visit        MyChart Information     Plum gives you secure access to your electronic health record. If you see a primary care provider, you can also send messages to your care team and make appointments. If you have questions, please call your primary care clinic.  If you do not have a primary care provider, please call [phone] and they will assist you.      Plum is an electronic gateway that provides easy, online access to your medical records. With Plum, you can request a clinic appointment, read your test results, renew a prescription or communicate with your care team.     To access your existing account, please contact your [facility] or call [phone] for assistance.        Care EveryWhere ID     This is your Care EveryWhere ID. This could be used by other organizations to access your [place] medical records  [id number]        Your Vitals Were     Height BMI (Body Mass Index)                1.803 m (5' 11\") 30.13 kg/m2           Blood Pressure from Last 3 Encounters: "   05/31/17 113/74    Weight from Last 3 Encounters:   08/14/18 98 kg (216 lb)   08/15/17 97.1 kg (214 lb)   05/31/17 95.3 kg (210 lb)              Today, you had the following     No orders found for display       Primary Care Provider Office Phone # Fax #    Marcello Lopez [phone]       [facility] [address] [address]        Equal Access to Services     RUDDY YANG : Hadii aad ku hadasho Soomaali, waaxda luqadaha, qaybta kaalmada adeegyada, waxay idiin hayaan adeeg kharamax labina barlow. So [place] [phone].    ATENCIÓN: Si habla español, tiene a cloud disposición servicios gratuitos de asistencia lingüística. [facility] [phone].    We comply with applicable federal civil rights laws and [place] laws. We do not discriminate on the basis of race, color, national origin, age, disability, sex, sexual orientation, or gender identity.            Thank you!     Thank you for choosing [facility] EAR NOSE AND THROAT  for your care. Our goal is always to provide you with excellent care. Hearing back from our patients is one way we can continue to improve our services. Please take a few minutes to complete the written survey that you may receive in the mail after your visit with us. Thank you!             Your Updated Medication List - Protect others around you: Learn how to safely use, store and throw away your medicines at www.disposemymeds.org.          This list is accurate as of 8/14/18 11:59 PM.  Always use your most recent med list.                   Brand Name Dispense Instructions for use Diagnosis    ATENOLOL PO      Take 25 mg by mouth daily        atenolol-chlorthalidone 50-25 MG per tablet    TENORETIC          FLUVIRIN Susp   Generic drug:  Influenza Vac Typ A&B Surf Ant      ADM 0.5ML IM UTD

## 2018-08-14 NOTE — PROGRESS NOTES
I had the pleasure of seeing Mr. Jeffy Machado today in followup in the Neurotology Clinic.      HISTORY OF PRESENT ILLNESS:  He is a 63-year-old gentleman who had a history of left obliterative otosclerosis.  I performed a partial stapedectomy on 05/31/2017.  Disease is quite prominent over the superior aspect of the promontory and I reduced this, but did have some expectation that there could be some restriction in full prosthesis motion.  He has been happy with his improvement in hearing, although there is some asymmetry between the left and right side, he feels that he is functioning quite well on a day-to-day basis.      His main reports today are that occasionally he will feel discomfort deep down in both of his ears.  His left ear still does feel like it does not hear as well as the right and there can be some fullness associated with that.  Also, if he yawns, he senses that his ear drums pop more than they used to.  Lastly, with his nose, he feels that he does not breathe well through the left side.  He previously had nasal surgery, what sounds like a septoplasty, and the physicians discovered in doing the surgery that a large portion of the cartilaginous septum was absent and initially questioned him that if he had forgotten about a nasal surgery that he had prior to this, but the patient is certain that he had not previously had nasal surgery and thus they wonder if he had a trauma as a child that led to a cartilaginous dissolution from a septal hematoma.  Nevertheless, he continues to be bothered by this difficulty breathing through the left side.     Patient Supplied Answers to Review of Systems   ENT ROS 8/2/2018   Constitutional -   Ears, Nose, Throat Ear pain, Nasal congestion or drainage   Endocrine -   10 point ROS was otherwise negative.       PHYSICAL EXAMINATION:  He appeared well.  Both tympanic membranes are in excellent position with no retraction or effusion and nicely aerated middle ear  spaces.  He has normal facial nerve function.      AUDIOGRAM:  His audiogram today demonstrates on the left, he has a mild sloping to profound mixed hearing loss with 30 dB speech reception threshold and 100% word recognition score and on the right has a normal sloping to severe high frequency loss with 15-dB speech reception threshold and 100% word recognition score.  The right has a type A tympanogram.  The asymmetry between the two sides is still 15-20 dB but this is a substantial improvement compared to preoperative levels.      IMPRESSION AND PLAN:  Mr. Machado is a 63-year-old man with obliterative otosclerosis in the left ear.  He had improvement in his hearing and is pleased with the result.   It is not complete closure of the air-bone gap.  We discussed that he may consider amplification.  He does not feel like he needs it in his left ear, but notes that in the future, he may wish to consider it.      He is interested in considering a revision surgery for his nose, but wants to give this a bit more consideration.  If he contacts us for a referral, it would be important that he sees one of my colleagues who specializes in revisions after patients have already had traditional septoplasty.  He will follow up with me in two years with an audiogram unless issues arise in the meantime.      MA/ms

## 2018-08-14 NOTE — LETTER
8/14/2018       RE: Jeffy Machado  [address]  [place] [address]     Dear Colleague,    Thank you for referring your patient, Jeffy Machado, to the [facility] EAR NOSE AND THROAT at [facility]. Please see a copy of my visit note below.    I had the pleasure of seeing Mr. Jeffy Machado today in followup in the Neurotology Clinic.      HISTORY OF PRESENT ILLNESS:  He is a 63-year-old gentleman who had a history of left obliterative otosclerosis.  I performed a partial stapedectomy on 05/31/2017.  Disease is quite prominent over the superior aspect of the promontory and I reduced this, but did have some expectation that there could be some restriction in full prosthesis motion.  He has been happy with his improvement in hearing, although there is some asymmetry between the left and right side, he feels that he is functioning quite well on a day-to-day basis.      His main reports today are that occasionally he will feel discomfort deep down in both of his ears.  His left ear still does feel like it does not hear as well as the right and there can be some fullness associated with that.  Also, if he yawns, he senses that his ear drums pop more than they used to.  Lastly, with his nose, he feels that he does not breathe well through the left side.  He previously had nasal surgery, what sounds like a septoplasty, and the physicians discovered in doing the surgery that a large portion of the cartilaginous septum was absent and initially questioned him that if he had forgotten about a nasal surgery that he had prior to this, but the patient is certain that he had not previously had nasal surgery and thus they wonder if he had a trauma as a child that led to a cartilaginous dissolution from a septal hematoma.  Nevertheless, he continues to be bothered by this difficulty breathing through the left side.     Patient Supplied Answers to Review of Systems  [facility] ENT ROS 8/2/2018   Constitutional -    Ears, Nose, Throat Ear pain, Nasal congestion or drainage   Endocrine -   10 point ROS was otherwise negative.       PHYSICAL EXAMINATION:  He appeared well.  Both tympanic membranes are in excellent position with no retraction or effusion and nicely aerated middle ear spaces.  He has normal facial nerve function.      AUDIOGRAM:  His audiogram today demonstrates on the left, he has a mild sloping to profound mixed hearing loss with 30 dB speech reception threshold and 100% word recognition score and on the right has a normal sloping to severe high frequency loss with 15-dB speech reception threshold and 100% word recognition score.  The right has a type A tympanogram.  The asymmetry between the two sides is still 15-20 dB but this is a substantial improvement compared to preoperative levels.      IMPRESSION AND PLAN:  Mr. Machado is a 63-year-old man with obliterative otosclerosis in the left ear.  He had improvement in his hearing and is pleased with the result.   It is not complete closure of the air-bone gap.  We discussed that he may consider amplification.  He does not feel like he needs it in his left ear, but notes that in the future, he may wish to consider it.      He is interested in considering a revision surgery for his nose, but wants to give this a bit more consideration.  If he contacts us for a referral, it would be important that he sees one of my colleagues who specializes in revisions after patients have already had traditional septoplasty.  He will follow up with me in two years with an audiogram unless issues arise in the meantime.      Again, thank you for allowing me to participate in the care of your patient.      Sincerely,    Mira Kahn MD

## 2020-03-10 ENCOUNTER — HEALTH MAINTENANCE LETTER (OUTPATIENT)
Age: 65
End: 2020-03-10

## 2020-07-14 ENCOUNTER — DOCUMENTATION ONLY (OUTPATIENT)
Dept: CARE COORDINATION | Facility: CLINIC | Age: 65
End: 2020-07-14

## 2020-09-04 ENCOUNTER — OFFICE VISIT (OUTPATIENT)
Dept: AUDIOLOGY | Facility: CLINIC | Age: 65
End: 2020-09-04
Payer: MEDICARE

## 2020-09-04 ENCOUNTER — OFFICE VISIT (OUTPATIENT)
Dept: OTOLARYNGOLOGY | Facility: CLINIC | Age: 65
End: 2020-09-04
Payer: MEDICARE

## 2020-09-04 VITALS
OXYGEN SATURATION: 100 % | HEIGHT: 70 IN | RESPIRATION RATE: 19 BRPM | DIASTOLIC BLOOD PRESSURE: 92 MMHG | WEIGHT: 221 LBS | BODY MASS INDEX: 31.64 KG/M2 | SYSTOLIC BLOOD PRESSURE: 147 MMHG | HEART RATE: 47 BPM | TEMPERATURE: 98.3 F

## 2020-09-04 DIAGNOSIS — H90.5 SENSORINEURAL HEARING LOSS OF RIGHT EAR: ICD-10-CM

## 2020-09-04 DIAGNOSIS — H80.92 OTOSCLEROSIS OF LEFT EAR: Primary | ICD-10-CM

## 2020-09-04 DIAGNOSIS — H90.72 MIXED HEARING LOSS OF LEFT EAR: Primary | ICD-10-CM

## 2020-09-04 DIAGNOSIS — H93.212: ICD-10-CM

## 2020-09-04 DIAGNOSIS — H90.3 SENSORINEURAL HEARING LOSS, BILATERAL: ICD-10-CM

## 2020-09-04 DIAGNOSIS — J34.89 NASAL OBSTRUCTION: ICD-10-CM

## 2020-09-04 RX ORDER — ATENOLOL AND CHLORTHALIDONE TABLET 50; 25 MG/1; MG/1
1 TABLET ORAL DAILY
COMMUNITY
Start: 2020-08-04 | End: 2022-09-09 | Stop reason: ALTCHOICE

## 2020-09-04 RX ORDER — ASPIRIN 81 MG/1
81 TABLET ORAL
COMMUNITY

## 2020-09-04 ASSESSMENT — PAIN SCALES - GENERAL: PAINLEVEL: NO PAIN (0)

## 2020-09-04 ASSESSMENT — MIFFLIN-ST. JEOR: SCORE: 1793.7

## 2020-09-04 NOTE — NURSING NOTE
"Chief Complaint   Patient presents with     RECHECK     follow up     Blood pressure (!) 147/92, pulse (!) 47, temperature 98.3  F (36.8  C), resp. rate 19, height 1.778 m (5' 10\"), weight 100.2 kg (221 lb), SpO2 100 %.    Shmuel Reyna LPN    "

## 2020-09-04 NOTE — LETTER
9/4/2020       RE: Jeffy Machado  [address] [place] [address]     Dear Colleague,    Thank you for referring your patient, Jeffy Machado, to the [facility] EAR NOSE AND THROAT at [facility]. Please see a copy of my visit note below.    September 4, 2020  Neurotology clinic progress note    I had the pleasure of seeing Mr. Jeffy Machado in follow-up today in the neurotology clinic.  He is a 65-year-old man with history of left obliterative otosclerosis. I performed a partial stapedectomy on 05/31/2017.  Disease was quite prominent over the superior aspect of the promontory and I reduced this, but we had the expectation that there could be some restriction in full prosthesis motion, which is confirmed with some persistence of a degree of conductive loss in the ear although it remains significantly improved compared to preop.      He returns today for scheduled interval follow-up.  He reports that he has been doing very well and continues to be able to communicate as he would like to without use of amplification in either ear.  He stated that he remains quite pleased with the hearing outcome.  He has tinnitus but does not have bothersome tinnitus nor does he have any new vertigo, otalgia, otorrhea, or other concerns.    The new symptom that he has started to notice over the last year or so is that at times when he is in an enclosed environment or when he hears certain pitches, they seem much louder than he would like them to be and this can make him need to step away from a situation or change the volume on things.  The sounds are not painful.  Rather there seems to be a small difference at times between sounds that are loud enough for him to hear them versus sounds that seem a bit too loud.  He met with the audiologist prior to my appointment who aptly noted that this symptom complex is suggestive of recruitment rather than hyperacusis.    He also mentioned at his last  "appointment that he continues to have difficulty moving air on the left side of his nose and this is becoming increasingly bothersome to him.  He previously had septoplasty elsewhere.  He is interested in discussing revision procedures with a septorhinoplasty surgeon.    Patient Supplied Answers to Review of Systems   ENT ROS 8/22/2020   Constitutional -   Ears, Nose, Throat Ear pain, Ringing/noise in ears, Nasal congestion or drainage   Endocrine -   10 point review of systems is otherwise negative.    Physical examination:  BP (!) 147/92   Pulse (!) 47   Temp 98.3  F (36.8  C)   Resp 19   Ht 1.778 m (5' 10\")   Wt 100.2 kg (221 lb)   SpO2 100%   BMI 31.71 kg/m    General: He appeared well and was in no acute distress  Respiratory: He was breathing comfortably without stridor or stertor  Neurologic: Facial nerve function is normal bilaterally.  Voice quality is excellent.  Otologic: Both ear canals lined with healthy skin and tympanic membranes were normal and mobile.  No evidence of retraction or concerns in the surgical region.  Supra-auricular incision also well-healed.    Audiogram:  Mild rising to normal sloping to moderately-severe SNHL right ear. Mild to profound mixed hearing loss left ear. 30 dB decrease in threshold at 3 kHz left ear re: audio 8/14/18. WRS 96% right, 100% left  Normal eardrum mobility right. Elevated right ipsi reflex, absent left contra.    Impression and plan:  1.  Left obliterative otosclerosis, conductive loss improved following surgery in 2017  2.  Bilateral high-frequency sensorineural hearing loss, essentially stable.  Word recognition excellent.  Discussed this today and the patient does not wish to proceed with amplification at this time but he does understand that he could consider a trial at his discretion  3.  Recruitment, left more than right.  We discussed that this has verifications for the fitting of amplification but that experience audiologist would be mindful of " his dynamic range.  He is not needing to change his daily activities tremendously for this.  We discussed that chronic earplug use is not recommended and he had not been using it anyway.  We talked about various strategies and awareness may be the most helpful thing in the setting.  4.  Nasal obstruction, with prior history of septoplasty.  He is interested in meeting with Dr. Luz Maria Fox to discuss septorhinoplasty.  He may wait for the era after COVID to do so.  We will place a referral again for his consideration.    Follow-up: 2 years with audiogram, sooner if he notes additional hearing loss or other otologic concerns      Mira Kahn MD  Otology & Neurotology  [facility]

## 2020-09-04 NOTE — PROGRESS NOTES
September 4, 2020  Neurotology clinic progress note    I had the pleasure of seeing Mr. Jeffy Machado in follow-up today in the neurotology clinic.  He is a 65-year-old man with history of left obliterative otosclerosis. I performed a partial stapedectomy on 05/31/2017.  Disease was quite prominent over the superior aspect of the promontory and I reduced this, but we had the expectation that there could be some restriction in full prosthesis motion, which is confirmed with some persistence of a degree of conductive loss in the ear although it remains significantly improved compared to preop.      He returns today for scheduled interval follow-up.  He reports that he has been doing very well and continues to be able to communicate as he would like to without use of amplification in either ear.  He stated that he remains quite pleased with the hearing outcome.  He has tinnitus but does not have bothersome tinnitus nor does he have any new vertigo, otalgia, otorrhea, or other concerns.    The new symptom that he has started to notice over the last year or so is that at times when he is in an enclosed environment or when he hears certain pitches, they seem much louder than he would like them to be and this can make him need to step away from a situation or change the volume on things.  The sounds are not painful.  Rather there seems to be a small difference at times between sounds that are loud enough for him to hear them versus sounds that seem a bit too loud.  He met with the audiologist prior to my appointment who aptly noted that this symptom complex is suggestive of recruitment rather than hyperacusis.    He also mentioned at his last appointment that he continues to have difficulty moving air on the left side of his nose and this is becoming increasingly bothersome to him.  He previously had septoplasty elsewhere.  He is interested in discussing revision procedures with a septorhinoplasty surgeon.    Patient  "Supplied Answers to Review of Systems   ENT ROS 8/22/2020   Constitutional -   Ears, Nose, Throat Ear pain, Ringing/noise in ears, Nasal congestion or drainage   Endocrine -   10 point review of systems is otherwise negative.    Physical examination:  BP (!) 147/92   Pulse (!) 47   Temp 98.3  F (36.8  C)   Resp 19   Ht 1.778 m (5' 10\")   Wt 100.2 kg (221 lb)   SpO2 100%   BMI 31.71 kg/m    General: He appeared well and was in no acute distress  Respiratory: He was breathing comfortably without stridor or stertor  Neurologic: Facial nerve function is normal bilaterally.  Voice quality is excellent.  Otologic: Both ear canals lined with healthy skin and tympanic membranes were normal and mobile.  No evidence of retraction or concerns in the surgical region.  Supra-auricular incision also well-healed.    Audiogram:  Mild rising to normal sloping to moderately-severe SNHL right ear. Mild to profound mixed hearing loss left ear. 30 dB decrease in threshold at 3 kHz left ear re: audio 8/14/18. WRS 96% right, 100% left  Normal eardrum mobility right. Elevated right ipsi reflex, absent left contra.    Impression and plan:  1.  Left obliterative otosclerosis, conductive loss improved following surgery in 2017  2.  Bilateral high-frequency sensorineural hearing loss, essentially stable.  Word recognition excellent.  Discussed this today and the patient does not wish to proceed with amplification at this time but he does understand that he could consider a trial at his discretion  3.  Recruitment, left more than right.  We discussed that this has verifications for the fitting of amplification but that experience audiologist would be mindful of his dynamic range.  He is not needing to change his daily activities tremendously for this.  We discussed that chronic earplug use is not recommended and he had not been using it anyway.  We talked about various strategies and awareness may be the most helpful thing in the " setting.  4.  Nasal obstruction, with prior history of septoplasty.  He is interested in meeting with Dr. Luz Maria Fox to discuss septorhinoplasty.  He may wait for the era after COVID to do so.  We will place a referral again for his consideration.    Follow-up: 2 years with audiogram, sooner if he notes additional hearing loss or other otologic concerns      Mira Kahn MD  Otology & Neurotology  [facility]

## 2020-09-04 NOTE — PROGRESS NOTES
AUDIOLOGY REPORT    SUMMARY: Audiology visit completed. See audiogram for results.      RECOMMENDATIONS: Follow-up with ENT.    Parrish Vu, [facility]  Licensed Audiologist  [place] License #[id number]

## 2020-09-04 NOTE — PATIENT INSTRUCTIONS
1. You were seen in the ENT Clinic today by .  If you have any questions or concerns after your appointment, please call   - Option 1: ENT Clinic: [phone]  - Option 2: Ashley (' Nurse): [phone]    2.   Plan to return to clinic in 2 years    CAIO Ramirez  Care Coordinator  [facility]  [phone]

## 2020-12-27 ENCOUNTER — HEALTH MAINTENANCE LETTER (OUTPATIENT)
Age: 65
End: 2020-12-27

## 2021-10-09 ENCOUNTER — HEALTH MAINTENANCE LETTER (OUTPATIENT)
Age: 66
End: 2021-10-09

## 2021-10-12 ENCOUNTER — TELEPHONE (OUTPATIENT)
Dept: OTOLARYNGOLOGY | Facility: CLINIC | Age: 66
End: 2021-10-12

## 2021-10-12 NOTE — TELEPHONE ENCOUNTER
FUTURE VISIT INFORMATION      FUTURE VISIT INFORMATION:    Date: 1/26/22    Time: 10 AM    Location: [facility]-ENT  REFERRAL INFORMATION:    Referring provider: Dr. Mira Kahn    Referring providers clinic: [facility] - ENT    Reason for visit/diagnosis: Septorhinoplasty consult    RECORDS REQUESTED FROM:       Clinic name Comments Records Status Imaging Status   [facility] 9/4/20 - ENT OV with Dr. Femi Ruvalcaba    [facility] - Surgery 5/31/17 - Op note for LASER LEFT STAPEDECTOMY, MIDDLE EAR EXPLORATION with Dr. Femi Ruvalcaba    [facility] - [facility] 5/12/14 - ENT OV with Dr. Cole Care Everywhere    [facility] - Imaging 5/6/14 - CT Sinus Care Everywhere 10/12 Req - In PACs                 * 10/12/21 2:46 PM Faxed req to [facility] for images to be pushed to [facility]. - Neelam  * 11/2/21 7:29 AM Faxed urg req to [facility] for images to be pushed to [facility]. - Neelam

## 2022-01-26 ENCOUNTER — PRE VISIT (OUTPATIENT)
Dept: OTOLARYNGOLOGY | Facility: CLINIC | Age: 67
End: 2022-01-26

## 2022-01-26 ENCOUNTER — OFFICE VISIT (OUTPATIENT)
Dept: OTOLARYNGOLOGY | Facility: CLINIC | Age: 67
End: 2022-01-26
Payer: MEDICARE

## 2022-01-26 VITALS — BODY MASS INDEX: 30.78 KG/M2 | WEIGHT: 215 LBS | HEIGHT: 70 IN

## 2022-01-26 DIAGNOSIS — J34.89 NASAL VESTIBULITIS: Primary | ICD-10-CM

## 2022-01-26 DIAGNOSIS — J34.89 NASAL SEPTAL PERFORATION: ICD-10-CM

## 2022-01-26 DIAGNOSIS — R09.81 NASAL CONGESTION: ICD-10-CM

## 2022-01-26 DIAGNOSIS — J34.2 DEVIATED NASAL SEPTUM: ICD-10-CM

## 2022-01-26 PROCEDURE — 99215 OFFICE O/P EST HI 40 MIN: CPT | Mod: 25 | Performed by: OTOLARYNGOLOGY

## 2022-01-26 PROCEDURE — 31231 NASAL ENDOSCOPY DX: CPT | Performed by: OTOLARYNGOLOGY

## 2022-01-26 ASSESSMENT — PAIN SCALES - GENERAL: PAINLEVEL: NO PAIN (0)

## 2022-01-26 ASSESSMENT — MIFFLIN-ST. JEOR: SCORE: 1761.48

## 2022-01-26 NOTE — NURSING NOTE
"Chief Complaint   Patient presents with     Consult     New patient       Height 1.778 m (5' 10\"), weight 97.5 kg (215 lb).    Jasper Reyes, EMT  "

## 2022-01-26 NOTE — PROGRESS NOTES
"Facial Plastic and Reconstructive Surgery Consultation    Referring Provider:   Mira Kahn MD  [address]  [place] [address]    HPI:   I had the pleasure of seeing Jeffy Machado today in clinic for consultation for nasal obstruction. Jeffy Machado is a 66 year old male with history of nasal obstruction, previously treated with septoplasty with residual nasal congestion and fullness on the left.     In 2016 he underwent surgery which was an intended septoplasty. From the history it seems as though the surgeon encountered a septal perforation at the time of surgery and attempted a local closure. The patient does not recall any inciting event that would have caused a perforation and was unaware of having one before that point. He was prescribed rinses after surgery but had a hard time using them as he does not love placing anything in his nose.     He has continued with what he describes as \"fullness\" on the left and describes that he feels a lot of mucous on that side. He describes that he can move air and does not feel an airway obstruction and that he can clear his mucous but is always getting more debris from the left. He denies frequent nose bleeds but does describe difficulty equalizing the pressure in his ears. He also describes that he sniffs a lot.     CT scan from 2017 demonstrates that he has a septal perforation, nasal valves also appear tight.     Review Of Systems  ROS: 10 point ROS neg other than the symptoms noted above in the HPI.    Patient Active Problem List   Diagnosis     Advance Care Planning     Past Surgical History:   Procedure Laterality Date     ENT SURGERY      septoplasty in Oct. 2017.  Had tubernator in left nostril 20     GENITOURINARY SURGERY      prostate surgery, removal     LASER DIODE STAPEDECTOMY Left 5/31/2017    Procedure: LASER DIODE STAPEDECTOMY;  Diode Laser Left Stapedectomy, Middle Ear Exploration;  Surgeon: Mira Kahn MD;  Location: [facility]" OR     Current Outpatient Medications   Medication Sig Dispense Refill     aspirin 81 MG EC tablet Take 81 mg by mouth       ATENOLOL PO Take 25 mg by mouth daily       atenolol-chlorthalidone (TENORETIC) 50-25 MG tablet Take 1 tablet by mouth daily (Patient not taking: Reported on 1/26/2022)       Cats and Dogs  Social History     Socioeconomic History     Marital status:      Spouse name: Not on file     Number of children: Not on file     Years of education: Not on file     Highest education level: Not on file   Occupational History     Not on file   Tobacco Use     Smoking status: Never Smoker     Smokeless tobacco: Never Used   Substance and Sexual Activity     Alcohol use: Yes     Comment: wine or a beer on occasion, very seldom     Drug use: No     Sexual activity: Yes     Partners: Female     Birth control/protection: None   Other Topics Concern     Not on file   Social History Narrative     Not on file     Social Determinants of Health     Financial Resource Strain: Not on file   Food Insecurity: Not on file   Transportation Needs: Not on file   Physical Activity: Not on file   Stress: Not on file   Social Connections: Not on file   Intimate Partner Violence: Not on file   Housing Stability: Not on file     Family History   Problem Relation Age of Onset     Migraines Mother        PE:  Alert and Oriented, Answering Questions Appropriately  Atraumatic, Normocephalic, Face Symmetric  Skin: De La Rosa 2  Facial Nerve Intact and facial movement symmetric  EOM's, PEERL  Nasal Exam: No external Deformity, no mucopurulence or polyps, no masses, anterior rhinoscopy shows right sided caudal deflection   Neck: No lymphadenopathy, no thyromegaly, trachea midline  Chest: No wheezing, cyanosis, or stridor  Card: Normal upper extremity pulses and capillary refill, not diaphoretic  Neuro/Psych: CN's 2-12 intact, Moves all extremities, ambulation in intact, positive affect, no notable muscle weakness     Imaging:CT  temporal bone reviewed and this demonstrates a septal perforation    PROCEDURE: rigid diagnostic nasal endoscopy   Indication: history of nasal congestion, surgery and nasal obstruction  Performed by: Areli Angel MD    Nasal Endoscopy is performed to provide a more thorough evaluation of the nasal airway than seen on standard nasal speculum exam.  Findings are as follows:  Generalized inflammation with erythema of the nasal mucosa  Nasal passages: right nasal airway with obstruction due to the caudal septal deflection to the right, hard to pass the scope to the middle meatus. The left is widely patent with a lot of thick mucoid crusting with bacterial overgrowth. This was removed with a bayonet. The perforation is seen posteriorly with one anterior band of tissue dividing two sections.       IMPRESSION:  Sinonasal inflammation  Right septal caudal deflection  Septal perforation       PLAN:    Jeffy Machado has significant nasal congestion and this is consistent with the inflammation seen in his nose. He also has significant bacterial overgrowth. I would like him to use nasal rinses with mupirocin, and I will evaluate him after three months.     His symptoms are mostly from inflammation and bacterial overgrowth. He has a septal deviation to the right but his does not complain of obstruction on the right.     Photodocumentation was obtained.     I spent a total of 45 minutes in the care of patient Jeffy Machado during today's office visit. This time includes reviewing the patient's chart and prior history, obtaining a history, performing an examination and evaluation and counseling the patient. This time also includes ordering mediations or tests necessary in addition to communication to other member's of the patient's health care team. Time spent in documentation and care coordination is included.

## 2022-01-26 NOTE — LETTER
"1/26/2022        RE: Jeffy Machado  [address]  [facility] [address]     Dear Colleague,    Thank you for referring your patient, Jeffy Machado, to the [facility] EAR NOSE AND THROAT CLINIC [place] at [facility]. Please see a copy of my visit note below.    Facial Plastic and Reconstructive Surgery Consultation    Referring Provider:   Mira Kahn MD  [address]  [place] [address]    HPI:   I had the pleasure of seeing Jeffy Machado today in clinic for consultation for nasal obstruction. Jeffy Machado is a 66 year old male with history of nasal obstruction, previously treated with septoplasty with residual nasal congestion and fullness on the left.     In 2016 he underwent surgery which was an intended septoplasty. From the history it seems as though the surgeon encountered a septal perforation at the time of surgery and attempted a local closure. The patient does not recall any inciting event that would have caused a perforation and was unaware of having one before that point. He was prescribed rinses after surgery but had a hard time using them as he does not love placing anything in his nose.     He has continued with what he describes as \"fullness\" on the left and describes that he feels a lot of mucous on that side. He describes that he can move air and does not feel an airway obstruction and that he can clear his mucous but is always getting more debris from the left. He denies frequent nose bleeds but does describe difficulty equalizing the pressure in his ears. He also describes that he sniffs a lot.     CT scan from 2017 demonstrates that he has a septal perforation, nasal valves also appear tight.     Review Of Systems  ROS: 10 point ROS neg other than the symptoms noted above in the HPI.    Patient Active Problem List   Diagnosis     Advance Care Planning     Past Surgical History:   Procedure Laterality Date     ENT " SURGERY      septoplasty in Oct. 2017.  Had tubernator in left nostril 20     GENITOURINARY SURGERY      prostate surgery, removal     LASER DIODE STAPEDECTOMY Left 5/31/2017    Procedure: LASER DIODE STAPEDECTOMY;  Diode Laser Left Stapedectomy, Middle Ear Exploration;  Surgeon: Mira Kahn MD;  Location: [facility] OR     Current Outpatient Medications   Medication Sig Dispense Refill     aspirin 81 MG EC tablet Take 81 mg by mouth       ATENOLOL PO Take 25 mg by mouth daily       atenolol-chlorthalidone (TENORETIC) 50-25 MG tablet Take 1 tablet by mouth daily (Patient not taking: Reported on 1/26/2022)       Cats and Dogs  Social History     Socioeconomic History     Marital status:      Spouse name: Not on file     Number of children: Not on file     Years of education: Not on file     Highest education level: Not on file   Occupational History     Not on file   Tobacco Use     Smoking status: Never Smoker     Smokeless tobacco: Never Used   Substance and Sexual Activity     Alcohol use: Yes     Comment: wine or a beer on occasion, very seldom     Drug use: No     Sexual activity: Yes     Partners: Female     Birth control/protection: None   Other Topics Concern     Not on file   Social History Narrative     Not on file     Social Determinants of Health     Financial Resource Strain: Not on file   Food Insecurity: Not on file   Transportation Needs: Not on file   Physical Activity: Not on file   Stress: Not on file   Social Connections: Not on file   Intimate Partner Violence: Not on file   Housing Stability: Not on file     Family History   Problem Relation Age of Onset     Migraines Mother        PE:  Alert and Oriented, Answering Questions Appropriately  Atraumatic, Normocephalic, Face Symmetric  Skin: De La Rosa 2  Facial Nerve Intact and facial movement symmetric  EOM's, PEERL  Nasal Exam: No external Deformity, no mucopurulence or polyps, no masses, anterior rhinoscopy shows right sided caudal  deflection   Neck: No lymphadenopathy, no thyromegaly, trachea midline  Chest: No wheezing, cyanosis, or stridor  Card: Normal upper extremity pulses and capillary refill, not diaphoretic  Neuro/Psych: CN's 2-12 intact, Moves all extremities, ambulation in intact, positive affect, no notable muscle weakness     Imaging:CT temporal bone reviewed and this demonstrates a septal perforation    PROCEDURE: rigid diagnostic nasal endoscopy   Indication: history of nasal congestion, surgery and nasal obstruction  Performed by: Areli Angel MD    Nasal Endoscopy is performed to provide a more thorough evaluation of the nasal airway than seen on standard nasal speculum exam.  Findings are as follows:  Generalized inflammation with erythema of the nasal mucosa  Nasal passages: right nasal airway with obstruction due to the caudal septal deflection to the right, hard to pass the scope to the middle meatus. The left is widely patent with a lot of thick mucoid crusting with bacterial overgrowth. This was removed with a bayonet. The perforation is seen posteriorly with one anterior band of tissue dividing two sections.       IMPRESSION:  Sinonasal inflammation  Right septal caudal deflection  Septal perforation       PLAN:    Jeffy Machado has significant nasal congestion and this is consistent with the inflammation seen in his nose. He also has significant bacterial overgrowth. I would like him to use nasal rinses with mupirocin, and I will evaluate him after three months.     His symptoms are mostly from inflammation and bacterial overgrowth. He has a septal deviation to the right but his does not complain of obstruction on the right.     Photodocumentation was obtained.     I spent a total of 45 minutes in the care of patient Jeffy Machado during today's office visit. This time includes reviewing the patient's chart and prior history, obtaining a history, performing an examination and evaluation and counseling the patient.  This time also includes ordering mediations or tests necessary in addition to communication to other member's of the patient's health care team. Time spent in documentation and care coordination is included.     Again, thank you for allowing me to participate in the care of your patient.      Sincerely,    Areli Angel MD

## 2022-01-26 NOTE — NURSING NOTE
Photodocumentation obtained.    Mupirocin nasal irrigation ordered.    Pt to perform nasal irrigations BID and f/u with Dr. Angel in 3 mos.    Cheli Latham RN  1/26/2022 12:02 PM

## 2022-01-29 ENCOUNTER — HEALTH MAINTENANCE LETTER (OUTPATIENT)
Age: 67
End: 2022-01-29

## 2022-04-20 ENCOUNTER — OFFICE VISIT (OUTPATIENT)
Dept: OTOLARYNGOLOGY | Facility: CLINIC | Age: 67
End: 2022-04-20
Payer: MEDICARE

## 2022-04-20 VITALS — WEIGHT: 215 LBS | BODY MASS INDEX: 30.78 KG/M2 | TEMPERATURE: 98.7 F | HEIGHT: 70 IN

## 2022-04-20 DIAGNOSIS — J34.89 NASAL VESTIBULITIS: Primary | ICD-10-CM

## 2022-04-20 DIAGNOSIS — J34.89 NASAL OBSTRUCTION: ICD-10-CM

## 2022-04-20 DIAGNOSIS — J34.89 NASAL SEPTAL PERFORATION: ICD-10-CM

## 2022-04-20 DIAGNOSIS — R09.81 NASAL CONGESTION: ICD-10-CM

## 2022-04-20 PROCEDURE — 31231 NASAL ENDOSCOPY DX: CPT | Performed by: OTOLARYNGOLOGY

## 2022-04-20 PROCEDURE — 99213 OFFICE O/P EST LOW 20 MIN: CPT | Mod: 25 | Performed by: OTOLARYNGOLOGY

## 2022-04-20 RX ORDER — BUDESONIDE 0.5 MG/2ML
INHALANT ORAL
Qty: 120 ML | Refills: 3 | Status: SHIPPED | OUTPATIENT
Start: 2022-04-20 | End: 2022-09-09

## 2022-04-20 ASSESSMENT — PAIN SCALES - GENERAL: PAINLEVEL: NO PAIN (0)

## 2022-04-20 NOTE — LETTER
Date:Deena 3, 2022      Provider requested that no letter be sent. Do not send.       [facility]

## 2022-04-20 NOTE — LETTER
4/20/2022       RE: Jeffy Machado  [address]  [facility] [address]     Dear Colleague,    Thank you for referring your patient, Jeffy Machado, to the [facility] EAR NOSE AND THROAT CLINIC [place] at [facility]. Please see a copy of my visit note below.          Facial Plastic and Reconstructive Surgery      Jeffy Machado is a patient of my previously who has a septal perforation which was diagnosed in 2016 when otolaryngologist went to perform a septoplasty and encountered the perforation and attempted closure.  He has been performing nasal irrigations and comes in today for follow-up.  Today where to evaluate the stability of the perforation.  He continues to note that he has nasal congestion and that the left side is primarily worse for breathing.    Nasal examination was performed with the nasal framework intact.  There is no evidence of saddle deformity or loss of tip support.  Anterior anoscopy was also performed however this does not allow for full the visualization of the perforation thus nasal endoscopy was performed.    PROCEDURE: diagnostic nasal endoscopy   Indication: septal perforation with nasal obstruction  Performed by: Areli Angel MD      Nasal Endoscopy is performed to provide a more thorough evaluation of the nasal airway than seen on standard nasal speculum exam.  Findings are as follows:  0 degree endoscope was passed through bilateral nasal passages.  This demonstrated the septal perforation as described previously.  Measurements were performed and these are consistent with the previous perforation.  There is well-healed mucosa around the perforation with no evidence of any further ulceration or bleeding.  The sinonasal mucosa does look inflamed and irritated however bilaterally.  This is likely contributed to his congestion.  I do not see significant bacterial overgrowth in the mucoid debris    Tolerated this procedure well  without any complications.    Assessment and plan:    We had a long discussion today about the septal perforation and the need or requirement for closing it.  Right now his symptoms are primarily due to congestion.  They do not seem very consistent with secondary symptoms due to the perforation.  I do think he has significant inflammation of the sinonasal cavity which could be better treated and may help his symptoms significantly.  We discussed that septal perforation closer is quite involved and his is quite large.    In order to first address the inflammation in the mucosal lining complaining of his symptoms I placed him on budesonide nasal rinses.  I will have these be performed consistently for several months and then check with him again and see how things may or may not have improved      Again, thank you for allowing me to participate in the care of your patient.      Sincerely,    Areli Angel MD

## 2022-04-20 NOTE — NURSING NOTE
"Chief Complaint   Patient presents with     RECHECK     Follow up       Temperature 98.7  F (37.1  C), temperature source Temporal, height 1.778 m (5' 10\"), weight 97.5 kg (215 lb).    Jasper Reyes, EMT  "

## 2022-04-21 NOTE — NURSING NOTE
Budesonide irrigations ordered.    Pt to f/u with Dr. Angel in 3 mos.    Cheli Latham RN  4/21/2022 2:18 PM

## 2022-05-12 ENCOUNTER — TELEPHONE (OUTPATIENT)
Dept: OTOLARYNGOLOGY | Facility: CLINIC | Age: 67
End: 2022-05-12
Payer: MEDICARE

## 2022-05-12 NOTE — TELEPHONE ENCOUNTER
[facility] Call Center    Phone Message    May a detailed message be left on voicemail: yes     Reason for Call: Medication Question or concern regarding medication   Prescription Clarification   Name of Medication: budesonide (PULMICORT) 0.5 MG/2ML neb solution   Prescribing Provider: Dr. Angel   Pharmacy: [id number] - [facility] [address]   What on the order needs clarification? Jeffy called to ask if there was any issues with insurance for this medication. Jeffy is not sure if the pharmacy received the medication, as he never received a voice message from the pharmacy letting him know the prescription was ready. Please reach out to Jeffy to discuss. Thank you!          Action Taken: Message routed to:  [facility] ([facility] ENT    Travel Screening: Not Applicable

## 2022-05-12 NOTE — TELEPHONE ENCOUNTER
Left vm for pt re: status of prescription for Budesonide irrigations.    I spoke to the pharmacy to confirm the issue:    Per pt's Medicare Part B, Budesonide is only covered under the diagnosis code of Asthma and/or COPD, even though the Budesonide is not being used for nebulizing purposes.    Will follow up with Dr. Angel to see if she wants to prescribe an alternative medication.    Cheli Latham RN  5/12/2022 2:36 PM

## 2022-05-13 ENCOUNTER — TELEPHONE (OUTPATIENT)
Dept: OTOLARYNGOLOGY | Facility: CLINIC | Age: 67
End: 2022-05-13
Payer: MEDICARE

## 2022-05-13 NOTE — TELEPHONE ENCOUNTER
Spoke with pt re: Budesonide irrigations.    The pharmacy told pt the prescription was ready for  and that the cost was reduced to $112 because a generic form of the drug was used.    Pt wanted to confirm it's OK to proceed with the generic, which I told him was OK.    Pt will  med tomorrow and begin nasal irrigations.    Cheli Latham RN  5/13/2022 2:01 PM

## 2022-05-13 NOTE — TELEPHONE ENCOUNTER
[facility] Call Center    Phone Message    May a detailed message be left on voicemail: yes     Reason for Call: Other: Patient got a call from the pharmacy stating a prescription is ready for , is wanting to talk to someone on Dr. Angel's team before picking this medication up to ensure this is the correct one he needs. He doesnt want to pay for this if its not the one he needs. Please reach out to patient to discuss. Thank you      Action Taken: Message routed to:  [facility] ([facility] ENT     Travel Screening: Not Applicable

## 2022-06-01 NOTE — PROGRESS NOTES
Facial Plastic and Reconstructive Surgery      Jeffy Machado is a patient of my previously who has a septal perforation which was diagnosed in 2016 when otolaryngologist went to perform a septoplasty and encountered the perforation and attempted closure.  He has been performing nasal irrigations and comes in today for follow-up.  Today where to evaluate the stability of the perforation.  He continues to note that he has nasal congestion and that the left side is primarily worse for breathing.    Nasal examination was performed with the nasal framework intact.  There is no evidence of saddle deformity or loss of tip support.  Anterior anoscopy was also performed however this does not allow for full the visualization of the perforation thus nasal endoscopy was performed.    PROCEDURE: diagnostic nasal endoscopy   Indication: septal perforation with nasal obstruction  Performed by: Areli Angel MD      Nasal Endoscopy is performed to provide a more thorough evaluation of the nasal airway than seen on standard nasal speculum exam.  Findings are as follows:  0 degree endoscope was passed through bilateral nasal passages.  This demonstrated the septal perforation as described previously.  Measurements were performed and these are consistent with the previous perforation.  There is well-healed mucosa around the perforation with no evidence of any further ulceration or bleeding.  The sinonasal mucosa does look inflamed and irritated however bilaterally.  This is likely contributed to his congestion.  I do not see significant bacterial overgrowth in the mucoid debris    Tolerated this procedure well without any complications.    Assessment and plan:    We had a long discussion today about the septal perforation and the need or requirement for closing it.  Right now his symptoms are primarily due to congestion.  They do not seem very consistent with secondary symptoms due to the perforation.  I do think he has significant  inflammation of the sinonasal cavity which could be better treated and may help his symptoms significantly.  We discussed that septal perforation closer is quite involved and his is quite large.    In order to first address the inflammation in the mucosal lining complaining of his symptoms I placed him on budesonide nasal rinses.  I will have these be performed consistently for several months and then check with him again and see how things may or may not have improved

## 2022-08-17 ENCOUNTER — OFFICE VISIT (OUTPATIENT)
Dept: OTOLARYNGOLOGY | Facility: CLINIC | Age: 67
End: 2022-08-17
Payer: MEDICARE

## 2022-08-17 VITALS
SYSTOLIC BLOOD PRESSURE: 143 MMHG | HEIGHT: 70 IN | WEIGHT: 221.3 LBS | BODY MASS INDEX: 31.68 KG/M2 | DIASTOLIC BLOOD PRESSURE: 98 MMHG | HEART RATE: 54 BPM | OXYGEN SATURATION: 95 %

## 2022-08-17 DIAGNOSIS — J34.89 NASAL OBSTRUCTION: Primary | ICD-10-CM

## 2022-08-17 PROCEDURE — 99213 OFFICE O/P EST LOW 20 MIN: CPT | Mod: 25 | Performed by: OTOLARYNGOLOGY

## 2022-08-17 PROCEDURE — 31231 NASAL ENDOSCOPY DX: CPT | Performed by: OTOLARYNGOLOGY

## 2022-08-17 ASSESSMENT — PAIN SCALES - GENERAL: PAINLEVEL: NO PAIN (0)

## 2022-08-17 NOTE — LETTER
"8/17/2022       RE: Jeffy Machado  [address]  [facility] [address]     Dear Colleague,    Thank you for referring your patient, Jeffy Machado, to the [facility] EAR NOSE AND THROAT CLINIC [place] at [facility]. Please see a copy of my visit note below.    Facial Plastic and Reconstructive Surgery      Jeffy Machado presents today for follow-up.  He has been using nasal budesonide irrigations and has tried these for the last month.  He describes significant improvement in his nasal congestion and nasal symptoms.  He states he feels a difference and feels that there his nose is improved.  He states its not \"perfect\" but that there is a notable difference.  He denies any significant crusting, nosebleeds, or any additional symptomatology.    PROCEDURE: 0 degree rigid nasal endoscopy, diagnostic    Indication: Septal perforation in addition to significant sinonasal inflammation  Performed by: Areli Angel MD      Nasal Endoscopy is performed to provide a more thorough evaluation of the nasal airway than seen on standard nasal speculum exam.  Findings are as follows:   Nasal exam is significantly improved.  There is generalized decreased erythema.  The nasal lining actually looks normal and pink.  The perks are stable with no evidence of significant crusting or breakdown.  The general lack of crusting and also significant improvement.  This is the best of seeing his nasal exam.    Assessment and plan:    We had a long discussion today about treatment options and maintenance.  He is aware that he does have allergies to trigger his symptoms.  I do believe that we have now gotten to a significantly better place reducing the generalized inflammation he had at his nose.  I do not believe his primary symptoms are caused by the septal perforations and themselves and I think the septal perforations are consequences of his severe inflammation.  He would like to try " not using the budesonide in the next recent future but will continue to irrigate his nose in the morning and at night.  He does have a refill available of the budesonide that he can restart if he finds that this is unsuccessful.  Another potential approach we can have in the future is to give him Flonase with the rinses.  I would like however to have him start to be cognizant of when his symptoms are worse and how this is associated to environmental or seasonal triggers.    I will see him back in 3 months time.        Again, thank you for allowing me to participate in the care of your patient.      Sincerely,    Areli Angel MD

## 2022-08-17 NOTE — PROGRESS NOTES
"Facial Plastic and Reconstructive Surgery      Jeffy Machado presents today for follow-up.  He has been using nasal budesonide irrigations and has tried these for the last month.  He describes significant improvement in his nasal congestion and nasal symptoms.  He states he feels a difference and feels that there his nose is improved.  He states its not \"perfect\" but that there is a notable difference.  He denies any significant crusting, nosebleeds, or any additional symptomatology.    PROCEDURE: 0 degree rigid nasal endoscopy, diagnostic    Indication: Septal perforation in addition to significant sinonasal inflammation  Performed by: Areli Angel MD      Nasal Endoscopy is performed to provide a more thorough evaluation of the nasal airway than seen on standard nasal speculum exam.  Findings are as follows:   Nasal exam is significantly improved.  There is generalized decreased erythema.  The nasal lining actually looks normal and pink.  The perks are stable with no evidence of significant crusting or breakdown.  The general lack of crusting and also significant improvement.  This is the best of seeing his nasal exam.    Assessment and plan:    We had a long discussion today about treatment options and maintenance.  He is aware that he does have allergies to trigger his symptoms.  I do believe that we have now gotten to a significantly better place reducing the generalized inflammation he had at his nose.  I do not believe his primary symptoms are caused by the septal perforations and themselves and I think the septal perforations are consequences of his severe inflammation.  He would like to try not using the budesonide in the next recent future but will continue to irrigate his nose in the morning and at night.  He does have a refill available of the budesonide that he can restart if he finds that this is unsuccessful.  Another potential approach we can have in the future is to give him Flonase with the " rinses.  I would like however to have him start to be cognizant of when his symptoms are worse and how this is associated to environmental or seasonal triggers.    I will see him back in 3 months time.

## 2022-08-17 NOTE — LETTER
Date:August 18, 2022      Patient was self referred, no letter generated. Do not send.        [facility] Health Information

## 2022-09-07 NOTE — PROGRESS NOTES
"  Neurotology Clinic      Name: Jeffy Machado  MRN: [id number]  Age: 67 year old  : 2022      Chief Complaint:   Follow up     History of Present Illness:   Jeffy Machado is a 67 year old male with a history of left obliterative otosclerosis and partial stapedectomy on 2017 who presents for follow up of left-sided hearing loss. At our last visit 2 years ago (20) he was doing well and able to communicate but had noticed increased sensitivity to sounds in enclosed environments or to higher pitches. He also had difficulty moving air on the left side of his nose and has since followed up with Dr. Angel and has been using budesonide rinses with improvement. He tells me today he sometimes has issues with listening to the television or the radio where it becomes uncomfortable. Louder noises are bothersome and he is unable to listen to the radio in the car. He has also had some intermittent bilateral fullness sensation which he suspected may be due to allergies. He also has some intermittent ear pain, ear numbness, and itchiness. His ears pop frequently when he opens his jaw and this seems to occur more than normal but he is unsure what triggers this. It feels as though he has fluid in his ears but he has not had an illness or any fluid in his ears. His symptoms wax and wane but overall they have contributed to be quite bothersome for him. He has also been bothered for his nose which has been bothering him but otherwise he has been generally healthy.      Review of Systems:   Pertinent items are noted in HPI or as in patient entered ROS below, remainder of complete ROS is negative.    ENT ROS 2022   Constitutional: -   Ears, Nose, Throat: Ear pain, Ringing/noise in ears, Nasal congestion or drainage   Musculoskeletal: -   Endocrine: -         Physical Exam:   BP (!) 151/91   Pulse 91   Temp 97.7  F (36.5  C)   Ht 1.778 m (5' 10\")   Wt 99.3 kg (219 lb)   SpO2 98%   BMI 31.42 kg/m   "     Constitutional:  The patient was accompanied by his wife, well-groomed, and in no acute distress.     Skin: Normal:  warm and pink without rash   Neurologic: Alert and oriented x 3.  CN's III-XII within normal limits.  Voice normal.    Psychiatric: The patient's affect was calm, cooperative, and appropriate.     Communication:  Normal; communicates verbally, normal voice quality.   Respiratory: Breathing comfortably without stridor or exertion of accessory muscles.    Eyes: Pupils were equal and reactive.  Extraocular movement intact.     Ears: Pinnae and tragus non-tender.  EAC's and TM's were clear.        Otologic microscope exam:  Bilateral ears: EAC clear, TM intact with no signs of perforation, middle ear is well aerated with no retractions, no signs of effusion or infection     Audiogram:  AUDIOGRAM: He underwent an audiogram today. This demonstrated:      Right: Speech reception threshold is 30 dB with 100% word recognition. Tympanogram A type   Left: Speech reception threshold is 45 dB with 100% word recognition. Tympanogram DNT type     Audiogram was independently reviewed    Assessment and Plan:   Jeffy Machado is a 67 year old male with a history of left obliterative otosclerosis and partial stapedectomy on 5/31/2017 who presents for follow up of left-sided hearing loss. I discussed with him that he has a more limited range of frequencies that he can hear comfortably due to his left sided hearing loss. His history of surgery has improved his hearing as optimally as possible and he could now benefit from a hearing aid and he is interested in pursuing this. I will place a referral for him to visit with our audiology team and pursue a hearing aid trial. I reviewed risk of dementia associated with untreated hearing loss. On review of his audiogram compared to previous his hearing appears stable which is reassuring that this will persist in the future. I discussed that his ears may be popping more often due  to the anatomy of his eustachian tubes but reassured I do not believe there is any other more concerning process causing this. There are no interventions indicated for this and he will likely experience this long term. He and his wife asked excellent questions and are open to considering amplification along with hearing follow up.        Scribe Disclosure:  I, Cadence Mcdonald, am serving as a scribe to document services personally performed by Mira Kahn MD at this visit, based upon the provider's statements to me. All documentation has been reviewed by the aforementioned provider prior to being entered into the official medical record.    The documentation recorded by the scribe accurately reflects the services I personally performed and the decisions made by me.    Mira Kahn MD  Otology & Neurotology  [facility]

## 2022-09-08 DIAGNOSIS — Z01.10 ENCOUNTER FOR HEARING TEST: Primary | ICD-10-CM

## 2022-09-09 ENCOUNTER — OFFICE VISIT (OUTPATIENT)
Dept: OTOLARYNGOLOGY | Facility: CLINIC | Age: 67
End: 2022-09-09
Payer: MEDICARE

## 2022-09-09 ENCOUNTER — OFFICE VISIT (OUTPATIENT)
Dept: AUDIOLOGY | Facility: CLINIC | Age: 67
End: 2022-09-09
Payer: MEDICARE

## 2022-09-09 VITALS
OXYGEN SATURATION: 98 % | SYSTOLIC BLOOD PRESSURE: 151 MMHG | WEIGHT: 219 LBS | DIASTOLIC BLOOD PRESSURE: 91 MMHG | HEIGHT: 70 IN | BODY MASS INDEX: 31.35 KG/M2 | TEMPERATURE: 97.7 F | HEART RATE: 91 BPM

## 2022-09-09 DIAGNOSIS — H90.2 CONDUCTIVE HEARING LOSS: ICD-10-CM

## 2022-09-09 DIAGNOSIS — H90.72 MIXED HEARING LOSS OF LEFT EAR: Primary | ICD-10-CM

## 2022-09-09 DIAGNOSIS — H90.6 MIXED CONDUCTIVE AND SENSORINEURAL HEARING LOSS OF BOTH EARS: Primary | ICD-10-CM

## 2022-09-09 DIAGNOSIS — H90.5 SENSORINEURAL HEARING LOSS OF RIGHT EAR: ICD-10-CM

## 2022-09-09 PROCEDURE — 99213 OFFICE O/P EST LOW 20 MIN: CPT | Mod: 25 | Performed by: OTOLARYNGOLOGY

## 2022-09-09 PROCEDURE — 92557 COMPREHENSIVE HEARING TEST: CPT | Performed by: AUDIOLOGIST

## 2022-09-09 PROCEDURE — 92550 TYMPANOMETRY & REFLEX THRESH: CPT | Mod: 52 | Performed by: AUDIOLOGIST

## 2022-09-09 PROCEDURE — 92504 EAR MICROSCOPY EXAMINATION: CPT | Performed by: OTOLARYNGOLOGY

## 2022-09-09 ASSESSMENT — PAIN SCALES - GENERAL: PAINLEVEL: NO PAIN (0)

## 2022-09-09 NOTE — PATIENT INSTRUCTIONS
You were seen in the ENT Clinic today by Dr. Kahn. If you have any questions or concerns after your appointment, please contact us (see below)      2.   Hearing aid consult        How to Contact Us:  Send a [employer] message to your provider. Our team will respond to you via [employer] Occasionally, we will need to call you to get further information.  For urgent matters (Monday-Friday), call the ENT Clinic: [phone] and speak with a call center team member - they will route your call appropriately.   If you'd like to speak directly with a nurse, please find our contact information below. We do our best to check voicemail frequently throughout the day, and will work to call you back within 1-2 days. For urgent matters, please use the general clinic phone numbers listed above.      Danni REYNOLDS RN  ENT RN Care Coordinator  Direct: [phone]    Mya PETTIT LPN  Direct: [phone]

## 2022-09-09 NOTE — LETTER
2022       RE: Jeffy Machado  [address]  [facility] [address]     Dear Colleague,    Thank you for referring your patient, Jeffy Machado, to the [facility] EAR NOSE AND THROAT CLINIC [place] at [facility]. Please see a copy of my visit note below.      Neurotology Clinic      Name: Jeffy Machado  MRN: [id number]  Age: 67 year old  : 2022      Chief Complaint:   Follow up     History of Present Illness:   Jeffy Machado is a 67 year old male with a history of left obliterative otosclerosis and partial stapedectomy on 2017 who presents for follow up of left-sided hearing loss. At our last visit 2 years ago (20) he was doing well and able to communicate but had noticed increased sensitivity to sounds in enclosed environments or to higher pitches. He also had difficulty moving air on the left side of his nose and has since followed up with Dr. Angel and has been using budesonide rinses with improvement. He tells me today he sometimes has issues with listening to the television or the radio where it becomes uncomfortable. Louder noises are bothersome and he is unable to listen to the radio in the car. He has also had some intermittent bilateral fullness sensation which he suspected may be due to allergies. He also has some intermittent ear pain, ear numbness, and itchiness. His ears pop frequently when he opens his jaw and this seems to occur more than normal but he is unsure what triggers this. It feels as though he has fluid in his ears but he has not had an illness or any fluid in his ears. His symptoms wax and wane but overall they have contributed to be quite bothersome for him. He has also been bothered for his nose which has been bothering him but otherwise he has been generally healthy.      Review of Systems:   Pertinent items are noted in HPI or as in patient entered ROS below, remainder of complete ROS is negative.  "   ENT ROS 9/6/2022   Constitutional: -   Ears, Nose, Throat: Ear pain, Ringing/noise in ears, Nasal congestion or drainage   Musculoskeletal: -   Endocrine: -         Physical Exam:   BP (!) 151/91   Pulse 91   Temp 97.7  F (36.5  C)   Ht 1.778 m (5' 10\")   Wt 99.3 kg (219 lb)   SpO2 98%   BMI 31.42 kg/m       Constitutional:  The patient was accompanied by his wife, well-groomed, and in no acute distress.     Skin: Normal:  warm and pink without rash   Neurologic: Alert and oriented x 3.  CN's III-XII within normal limits.  Voice normal.    Psychiatric: The patient's affect was calm, cooperative, and appropriate.     Communication:  Normal; communicates verbally, normal voice quality.   Respiratory: Breathing comfortably without stridor or exertion of accessory muscles.    Eyes: Pupils were equal and reactive.  Extraocular movement intact.     Ears: Pinnae and tragus non-tender.  EAC's and TM's were clear.        Otologic microscope exam:  Bilateral ears: EAC clear, TM intact with no signs of perforation, middle ear is well aerated with no retractions, no signs of effusion or infection     Audiogram:  AUDIOGRAM: He underwent an audiogram today. This demonstrated:      Right: Speech reception threshold is 30 dB with 100% word recognition. Tympanogram A type   Left: Speech reception threshold is 45 dB with 100% word recognition. Tympanogram DNT type     Audiogram was independently reviewed    Assessment and Plan:   Jeffy Machado is a 67 year old male with a history of left obliterative otosclerosis and partial stapedectomy on 5/31/2017 who presents for follow up of left-sided hearing loss. I discussed with him that he has a more limited range of frequencies that he can hear comfortably due to his left sided hearing loss. His history of surgery has improved his hearing as optimally as possible and he could now benefit from a hearing aid and he is interested in pursuing this. I will place a referral for him to " visit with our audiology team and pursue a hearing aid trial. I reviewed risk of dementia associated with untreated hearing loss. On review of his audiogram compared to previous his hearing appears stable which is reassuring that this will persist in the future. I discussed that his ears may be popping more often due to the anatomy of his eustachian tubes but reassured I do not believe there is any other more concerning process causing this. There are no interventions indicated for this and he will likely experience this long term. He and his wife asked excellent questions and are open to considering amplification along with hearing follow up.        Scribe Disclosure:  I, Cadence Harry, am serving as a scribe to document services personally performed by Mira Kahn MD at this visit, based upon the provider's statements to me. All documentation has been reviewed by the aforementioned provider prior to being entered into the official medical record.    The documentation recorded by the scribe accurately reflects the services I personally performed and the decisions made by me.    Mira Kahn MD  Otology & Neurotology  [facility]        Again, thank you for allowing me to participate in the care of your patient.      Sincerely,    Mira Kahn MD

## 2022-09-09 NOTE — LETTER
Date:September 19, 2022      Patient was self referred, no letter generated. Do not send.        [facility] Health Information

## 2022-09-09 NOTE — NURSING NOTE
"Chief Complaint   Patient presents with     RECHECK     2 year follow up    \    Blood pressure (!) 151/91, pulse 91, temperature 97.7  F (36.5  C), height 1.778 m (5' 10\"), weight 99.3 kg (219 lb), SpO2 98 %.    Shmuel Reyna LPN  "

## 2022-09-09 NOTE — PROGRESS NOTES
AUDIOLOGY REPORT    SUMMARY: Audiology visit completed. See audiogram for results.      RECOMMENDATIONS: Follow-up with ENT.    Parrish Vu, [facility]  Licensed Audiologist  [place] License #[id number]

## 2022-09-11 ENCOUNTER — HEALTH MAINTENANCE LETTER (OUTPATIENT)
Age: 67
End: 2022-09-11

## 2022-11-16 ENCOUNTER — OFFICE VISIT (OUTPATIENT)
Dept: OTOLARYNGOLOGY | Facility: CLINIC | Age: 67
End: 2022-11-16
Payer: MEDICARE

## 2022-11-16 VITALS
HEIGHT: 70 IN | OXYGEN SATURATION: 97 % | BODY MASS INDEX: 32.3 KG/M2 | HEART RATE: 53 BPM | DIASTOLIC BLOOD PRESSURE: 99 MMHG | WEIGHT: 225.6 LBS | SYSTOLIC BLOOD PRESSURE: 150 MMHG

## 2022-11-16 DIAGNOSIS — J34.89 NASAL SEPTAL PERFORATION: ICD-10-CM

## 2022-11-16 DIAGNOSIS — R09.81 NASAL CONGESTION: Primary | ICD-10-CM

## 2022-11-16 DIAGNOSIS — J31.0 CHRONIC RHINITIS: ICD-10-CM

## 2022-11-16 PROCEDURE — 31231 NASAL ENDOSCOPY DX: CPT | Performed by: OTOLARYNGOLOGY

## 2022-11-16 PROCEDURE — 99213 OFFICE O/P EST LOW 20 MIN: CPT | Mod: 25 | Performed by: OTOLARYNGOLOGY

## 2022-11-16 ASSESSMENT — PAIN SCALES - GENERAL: PAINLEVEL: NO PAIN (0)

## 2022-11-16 NOTE — LETTER
11/16/2022       RE: Jeffy Machado  [address] [place] [facility] [address]     Dear Colleague,    Thank you for referring your patient, Jeffy Machado, to the [facility] EAR NOSE AND THROAT CLINIC [place] at [facility]. Please see a copy of my visit note below.    Facial Plastic and Reconstructive Surgery      Jeffy Machado comes in for evaluation today.  He has a history of significant nasal inflammatory disease that we have been managing with budesonide nasal rinses.  He had significant improvement in the inflammation last time and thus I allowed him to just transition to the nasal saline irrigations.  He does have a history of a septal perforation.    He presents today stating that his nose feels stable.  He does not feel any worsening once he has stopped the nasal steroid but he continues to feel improvement as he had when the rinses were started.  He has not had any nosebleeds or any new concerning findings.    PROCEDURE: Diagnostic nasal endoscopy   Indication: Septal perforation sinonasal disease  Performed by: Areli Angel MD      Nasal Endoscopy is performed to provide a more thorough evaluation of the nasal airway than seen on standard nasal speculum exam.  Findings are as follows:   Bilateral nasal passages were evaluated.  First I started on the right with 0 degree scope and found that the septum slightly bulges in this direction and thus the perforation is not readily visible.  On the left the nasal passage demonstrates the perforation which is stable.  There is no ulceration around it.  It has not gotten any larger.  In general bilateral sinonasal mucosa looks significantly improved, the erythema is much reduced, and there is significantly less mucoid debris.  In general this is consistently improved examination.  Patient tolerated procedure well without any complications.    Assessment and plan:  We will continue sinonasal irrigations and  observation.  We had a long discussion today about alternatives and septal perforation closure.  I do not think that this is optimal.  I think he has minimal symptoms from the perforation his primary symptoms are from sinonasal inflammation which she is managing well right now.  He and his wife are in agreement.  I will see them back in 6 months time.  After that he will be able to follow-up as needed.    I spent a total of 30 minutes in the care of patient Jeffy Machado during today's office visit. This time includes reviewing the patient's chart and prior history, obtaining a history, performing an examination and evaluation and counseling the patient. This time also includes ordering mediations or tests necessary in addition to communication to other member's of the patient's health care team. Time spent in documentation and care coordination is included.       Again, thank you for allowing me to participate in the care of your patient.      Sincerely,    Areli Angel MD

## 2022-11-16 NOTE — PROGRESS NOTES
Facial Plastic and Reconstructive Surgery      Jeffy Machado comes in for evaluation today.  He has a history of significant nasal inflammatory disease that we have been managing with budesonide nasal rinses.  He had significant improvement in the inflammation last time and thus I allowed him to just transition to the nasal saline irrigations.  He does have a history of a septal perforation.    He presents today stating that his nose feels stable.  He does not feel any worsening once he has stopped the nasal steroid but he continues to feel improvement as he had when the rinses were started.  He has not had any nosebleeds or any new concerning findings.    PROCEDURE: Diagnostic nasal endoscopy   Indication: Septal perforation sinonasal disease  Performed by: Areli Angel MD      Nasal Endoscopy is performed to provide a more thorough evaluation of the nasal airway than seen on standard nasal speculum exam.  Findings are as follows:   Bilateral nasal passages were evaluated.  First I started on the right with 0 degree scope and found that the septum slightly bulges in this direction and thus the perforation is not readily visible.  On the left the nasal passage demonstrates the perforation which is stable.  There is no ulceration around it.  It has not gotten any larger.  In general bilateral sinonasal mucosa looks significantly improved, the erythema is much reduced, and there is significantly less mucoid debris.  In general this is consistently improved examination.  Patient tolerated procedure well without any complications.    Assessment and plan:  We will continue sinonasal irrigations and observation.  We had a long discussion today about alternatives and septal perforation closure.  I do not think that this is optimal.  I think he has minimal symptoms from the perforation his primary symptoms are from sinonasal inflammation which she is managing well right now.  He and his wife are in agreement.  I will  see them back in 6 months time.  After that he will be able to follow-up as needed.    I spent a total of 30 minutes in the care of patient Jeffy Machado during today's office visit. This time includes reviewing the patient's chart and prior history, obtaining a history, performing an examination and evaluation and counseling the patient. This time also includes ordering mediations or tests necessary in addition to communication to other member's of the patient's health care team. Time spent in documentation and care coordination is included.

## 2022-11-16 NOTE — LETTER
Date:November 17, 2022      Patient was self referred, no letter generated. Do not send.        [facility] Health Information

## 2022-11-17 ENCOUNTER — OFFICE VISIT (OUTPATIENT)
Dept: AUDIOLOGY | Facility: CLINIC | Age: 67
End: 2022-11-17
Payer: COMMERCIAL

## 2022-11-17 DIAGNOSIS — H90.3 ASYMMETRICAL SENSORINEURAL HEARING LOSS: Primary | ICD-10-CM

## 2022-11-17 PROCEDURE — 92590 PR HEARING AID EXAM MONAURAL: CPT | Mod: LT | Performed by: AUDIOLOGIST

## 2022-11-18 NOTE — PROGRESS NOTES
AUDIOLOGY REPORT    SUBJECTIVE: Jeffy Machado is a 67 year old male was seen in the Audiology Clinic at  [facility] on 11/17/22 to discuss concerns with hearing and functional communication difficulties. The patient was accompanied by their wife, Lizbeth. Jeffy has been seen previously on 9/9/22, and results revealed a right mild to moderately-severe sensorineural hearing loss and a left mild to profound sensorineural hearing loss. The patient was medically evaluated and determined to be cleared for a hearing aids by Mira Kahn M.D. Jeffy reports he does not have any difficulty with hearing, however is curious if he is missing more speech than he thinks he is    OBJECTIVE:  Abuse Screening:  Do you feel unsafe at home or work/school? No  Do you feel threatened by someone? No  Does anyone try to keep you from having contact with others, or doing things outside of your home? No  Physical signs of abuse present? No    Patient is a hearing aid candidate. Patient would like to move forward with a hearing aid evaluation today. Therefore, the patient was presented with different options for amplification to help aid in communication. Discussed styles, levels of technology and monaural vs. binaural fitting. We discussed that he isn't a great hearing aid candidate in the right ear at this time. He is in agreement to begin with just a left hearing aid.    The hearing aid(s) mutually chosen were:  Left: Phonak Audeo L50-R  COLOR: Black  BATTERY SIZE: rechargeable  EARMOLD/TIPS: Power dome large  CANAL/ LENGTH: 2    ASSESSMENT:     Reviewed purchase information and warranty information with patient. The 45 day trial period was explained to patient. The patient was given a copy of the [place] Department of Health consumer brochure on purchasing hearing instruments. Patient risk factors have been provided to the patient in writing prior to the sale of the hearing aid per FDA  regulation. The risk factors are also available in the User Instructional Booklet to be presented on the day of the hearing aid fitting. Hearing aid(s) ordered. Hearing aid evaluation completed.    PLAN: Jeffy is scheduled to return in 2-3 weeks for a hearing aid fitting and programming. Purchase agreement will be completed on that date. Please contact this clinic with any questions or concerns.      LUIS Barrios.  Audiology Doctoral Student     I was present with the patient for the entire Audiology appointment, including all procedures/testing performed by the Sasha student, and agree with the student s assessment and plan as documented.     Parrish Vu, [facility]  Licensed Audiologist  [place] License #[id number]

## 2022-12-14 ENCOUNTER — OFFICE VISIT (OUTPATIENT)
Dept: AUDIOLOGY | Facility: CLINIC | Age: 67
End: 2022-12-14
Payer: COMMERCIAL

## 2022-12-14 DIAGNOSIS — H90.3 ASYMMETRICAL SENSORINEURAL HEARING LOSS: Primary | ICD-10-CM

## 2022-12-14 PROCEDURE — V5011 HEARING AID FITTING/CHECKING: HCPCS | Performed by: AUDIOLOGIST

## 2022-12-14 PROCEDURE — V5257 HEARING AID, DIGIT, MON, BTE: HCPCS | Performed by: AUDIOLOGIST

## 2022-12-14 PROCEDURE — V5020 CONFORMITY EVALUATION: HCPCS | Performed by: AUDIOLOGIST

## 2022-12-14 PROCEDURE — V5241 DISPENSING FEE, MONAURAL: HCPCS | Performed by: AUDIOLOGIST

## 2022-12-14 NOTE — PROGRESS NOTES
AUDIOLOGY REPORT    SUBJECTIVE:   Jfefy Machado is a 67 year old male who was seen in the Audiology Clinic at the [facility] and [facility] for a fitting of monaural amplification. Previous results have revealed a right mild to moderately-severe sensorineural hearing loss and a left mild to profound sensorineural hearing loss. The patient was given medical clearance to pursue amplification by  Mira Kahn MD. He was accompanied by his wife, Lizbeth.    OBJECTIVE:   The hearing aid conformity evaluation was completed.The hearing aids were placed and they provided a good fit. Real-ear-probe-microphone measurements were completed on the [place] system and were a good match to NAL-NL1 target with soft sounds audible, moderate sounds comfortable, and loud sounds below discomfort. UCLs are verified through maximum power output measures and demonstrate appropriate limiting of loud inputs. Jeffy was oriented to proper hearing aid use, care, cleaning (no water, dry brush), batteries (size: BATTERY SIZE: rechargeable, insertion/removal, toxicity, low-battery signal), aid insertion/removal, user booklet, warranty information, storage cases, and other hearing aid details. The patient confirmed understanding of hearing aid use and care, and showed proper insertion of hearing aid and batteries while in the office today.Jeffy reported good volume and sound quality today.     EAR(S) FIT: Left  HEARING AID MODEL NAME: Phonak Audeo L50-R  HEARING AID STYLE: -in-the-ear behind-the-ear  EARMOLDS/TIP: Medium power  SERIAL NUMBERS: Left: [id number]  WARRANTY END DATE: 2/15/26    ASSESSMENT:   Phonak Audeo L50-R hearing aid(s) were fit today. Verification measures were performed. Jeffy signed the Hearing Aid Purchase Agreement and was given a copy, as well as details on his hearing aids. Patient was counseled that exact out of pocket amounts cannot be determined for hearing aid claims being sent to  insurance. Any insurance coverage information presented to the patient is an estimate only, and is not a guarantee of payment. Patient has been advised to check with their own insurance.    PLAN:  Jfefy will return for follow-up in 2-3 weeks for a hearing aid review appointment. Please call this clinic with questions regarding today s appointment.    Parrish Vu, [facility]  Licensed Audiologist  [place] License #[id number]

## 2023-01-12 ENCOUNTER — OFFICE VISIT (OUTPATIENT)
Dept: AUDIOLOGY | Facility: CLINIC | Age: 68
End: 2023-01-12
Payer: MEDICARE

## 2023-01-12 DIAGNOSIS — H90.3 ASYMMETRICAL SENSORINEURAL HEARING LOSS: Primary | ICD-10-CM

## 2023-01-12 PROCEDURE — 99207 PR ASSESSMENT FOR HEARING AID: CPT | Performed by: AUDIOLOGIST

## 2023-01-12 NOTE — PROGRESS NOTES
AUDIOLOGY REPORT    SUBJECTIVE:  Jeffy Machado is a 67 year old male who was seen in the Audiology Clinic at the [facility] on 1/12/2023  for a follow-up check regarding the fitting of new hearing aids. Previous results have revealed a right mild to moderately-severe sensorineural hearing loss and a left mild to profound sensorineural hearing loss.  The patient has been seen previously in this clinic and was fit with a left Phonak Audeo L50-R on 12/14/22.  Jeffy reports that he has not had any issues with the hearing aid but questions the amount of benefit he is getting from it. He also had questions about the volume control    OBJECTIVE:   We reviewed the trial period and how to use the volume control. He reported that he would like to keep the hearing aid.    Reviewed care, cleaning (no water, dry brush), batteries (size n/a) insertion/removal, toxicity, low-battery signal), aid insertion/removal, volume adjustment (if applicable), user booklet, warranty information, storage cases, and other hearing aid details.     No charge visit today (in warranty hearing aid check).    ASSESSMENT:   A follow-up appointment for hearing aid fitting was completed today. Changes to hearing aid was completed as outlined above.     PLAN:  Jeffy will return for follow-up as needed, or at least every 9-12 months for cleaning and assessment of hearing aid.  . Please call this clinic with any questions regarding today s appointment.    Parrish Vu, [facility]  Licensed Audiologist  [place] License #[id number]

## 2023-05-17 ENCOUNTER — OFFICE VISIT (OUTPATIENT)
Dept: OTOLARYNGOLOGY | Facility: CLINIC | Age: 68
End: 2023-05-17
Payer: MEDICARE

## 2023-05-17 VITALS
WEIGHT: 223 LBS | HEIGHT: 70 IN | DIASTOLIC BLOOD PRESSURE: 88 MMHG | SYSTOLIC BLOOD PRESSURE: 145 MMHG | BODY MASS INDEX: 31.92 KG/M2 | HEART RATE: 71 BPM

## 2023-05-17 DIAGNOSIS — R09.81 NASAL CONGESTION: Primary | ICD-10-CM

## 2023-05-17 DIAGNOSIS — J34.89 NASAL SEPTAL PERFORATION: ICD-10-CM

## 2023-05-17 PROCEDURE — 99213 OFFICE O/P EST LOW 20 MIN: CPT | Mod: 25 | Performed by: OTOLARYNGOLOGY

## 2023-05-17 PROCEDURE — 31231 NASAL ENDOSCOPY DX: CPT | Performed by: OTOLARYNGOLOGY

## 2023-05-17 ASSESSMENT — PAIN SCALES - GENERAL: PAINLEVEL: NO PAIN (0)

## 2023-05-17 NOTE — PROGRESS NOTES
Facial Plastic and Reconstructive Surgery      Jeffy Machado is here for follow up. He has been conservatively managing his perforation. He is feeling great and has had minimal congestion. He is performing nasal rinses twice and day and would like to know if he can reduce the frequency.    PROCEDURE: diagnostic nasal endoscopy   Indication: evaluation of septal perforation  Performed by: Areli Angel MD      Nasal Endoscopy is performed to provide a more thorough evaluation of the nasal airway than seen on standard nasal speculum exam.  Findings are as follows:   Nasal passages are patent and there is not debris. The mucosa looks nicely recovered with good moisture. No inflammation, crusting, bleeding. The perforation is well mucosalized with no ulceration.               Really nice recovery and exam    A/P:  Jeffy is doing really well  His exam is impressively good today.  He is to continue nasal rinses but now can begin to titrate how often he needs to do them. He will be guided by congestion and symptoms.    I will see him back in one year.

## 2023-05-17 NOTE — LETTER
5/17/2023       RE: Jeffy Machado  [address] [place] [facility] [address]     Dear Colleague,    Thank you for referring your patient, Jeffy Machado, to the [facility] EAR NOSE AND THROAT CLINIC [place] at [facility]. Please see a copy of my visit note below.    Facial Plastic and Reconstructive Surgery      Jeffy Machado is here for follow up. He has been conservatively managing his perforation. He is feeling great and has had minimal congestion. He is performing nasal rinses twice and day and would like to know if he can reduce the frequency.    PROCEDURE: diagnostic nasal endoscopy   Indication: evaluation of septal perforation  Performed by: Areli Angel MD      Nasal Endoscopy is performed to provide a more thorough evaluation of the nasal airway than seen on standard nasal speculum exam.  Findings are as follows:   Nasal passages are patent and there is not debris. The mucosa looks nicely recovered with good moisture. No inflammation, crusting, bleeding. The perforation is well mucosalized with no ulceration.               Really nice recovery and exam    A/P:  Jeffy is doing really well  His exam is impressively good today.  He is to continue nasal rinses but now can begin to titrate how often he needs to do them. He will be guided by congestion and symptoms.    I will see him back in one year.           Again, thank you for allowing me to participate in the care of your patient.      Sincerely,    Areli Angel MD

## 2023-08-17 ENCOUNTER — OFFICE VISIT (OUTPATIENT)
Dept: AUDIOLOGY | Facility: CLINIC | Age: 68
End: 2023-08-17
Payer: MEDICARE

## 2023-08-17 DIAGNOSIS — H90.3 ASYMMETRICAL SENSORINEURAL HEARING LOSS: Primary | ICD-10-CM

## 2023-08-17 PROCEDURE — 92557 COMPREHENSIVE HEARING TEST: CPT | Performed by: AUDIOLOGIST

## 2023-08-17 PROCEDURE — 92550 TYMPANOMETRY & REFLEX THRESH: CPT | Mod: 52 | Performed by: AUDIOLOGIST

## 2023-08-17 NOTE — PROGRESS NOTES
AUDIOLOGY REPORT    SUBJECTIVE:  Jeffy Machado is a 68 year old male who was seen in the Audiology Clinic at the [facility] and Surgery [facility] for audiologic evaluation and hearing aid check, referred by Mira Kahn M.D. The patient has been seen previously in this clinic on 9/9/22 for assessment and results indicated Mild rising to normal sloping to moderately-severe SNHL right ear. Mild sloping to profound mixed hearing loss left ear. Jeffy was fit with a left Phonak Audeo L50-R on 12/14/22. Jeffy reports the hearing aid has been working well for him.    Jeffy has a significant history for a left partial stapedectomy on 5/31/17. Jeffy reports his hearing has been stable. The patient reports he has been experiencing some mild left ear pain since the surgery. The patient reports some ear pressure on occasion bilaterally. The patient denies dizziness, bilateral tinnitus and bilateral drainage.  The patient notes difficulty with communication in a variety of listening situations.  They were accompanied today by their wife.    OBJECTIVE:  Otoscopic exam indicates ears are clear of cerumen bilaterally     Pure Tone Thresholds assessed using conventional audiometry with good  reliability from 250-8000 Hz bilaterally using insert earphones and circumaural headphones     RIGHT:  mild sloping to moderate-severe sensorineural hearing loss    LEFT:    mild sloping to profound sensorineural hearing loss    Tympanogram:    RIGHT: normal eardrum mobility    LEFT:   Did not test due to surgical ear    Reflexes (reported by stimulus ear):  RIGHT: Ipsilateral is present at elevated levels  RIGHT: Contralateral did not test due to surgical ear  LEFT:   Ipsilateral did not test due to surgical ear  LEFT:   Contralateral is absent at frequencies tested      Speech Reception Threshold:    RIGHT: 25 dB HL    LEFT:   45 dB HL  Word Recognition Score:     RIGHT: 100% at 70 dB HL using NU-6 recorded word list.    LEFT:    100% at 85 dB HL using NU-6 recorded word list.      Hearing aid was checked and was in good working condition. The hearing aid was cleaned and dried during today's appointment. The dome and wax filter were replaced. Listening check was good. No charge for hearing aid check as hearing aid is in warranty.    ASSESSMENT:  Today's results revealed mild rising to normal sloping to moderately-severe sensorineural hearing loss in the right ear. Mild sloping to profound sensorineural hearing loss in left ear. Compared to patient's previous audiogram dated 9/9/2022, hearing is stable. Hearing aid was cleaned and checked today      PLAN:  Jeffy will return for follow-up as needed, or at least once a year for cleaning and assessment of hearing aid, and for monitoring of hearing sensitivity. Please call this clinic with any questions regarding today s appointment.    LUIS Jauregui.  Audiology Doctoral Student  [place] #[id number]      I was present with the patient for the entire Audiology appointment including all procedures/testing performed by the AuD student, and agree with the student s assessment and plan as documented.      Parrish Vu, [facility]  Licensed Audiologist  [place] License #[id number]

## 2023-10-07 ENCOUNTER — HEALTH MAINTENANCE LETTER (OUTPATIENT)
Age: 68
End: 2023-10-07

## 2023-12-27 ENCOUNTER — MYC MEDICAL ADVICE (OUTPATIENT)
Dept: OTOLARYNGOLOGY | Facility: CLINIC | Age: 68
End: 2023-12-27
Payer: MEDICARE

## 2024-02-06 ENCOUNTER — MYC MEDICAL ADVICE (OUTPATIENT)
Dept: OTOLARYNGOLOGY | Facility: CLINIC | Age: 69
End: 2024-02-06
Payer: MEDICARE

## 2024-06-17 PROBLEM — Z71.89 ADVANCED DIRECTIVES, COUNSELING/DISCUSSION: Status: RESOLVED | Noted: 2017-10-04 | Resolved: 2024-06-17

## 2024-08-14 ENCOUNTER — OFFICE VISIT (OUTPATIENT)
Dept: AUDIOLOGY | Facility: CLINIC | Age: 69
End: 2024-08-14
Payer: MEDICARE

## 2024-08-14 DIAGNOSIS — H90.3 ASYMMETRICAL SENSORINEURAL HEARING LOSS: Primary | ICD-10-CM

## 2024-08-14 PROCEDURE — 92550 TYMPANOMETRY & REFLEX THRESH: CPT | Mod: 52 | Performed by: AUDIOLOGIST

## 2024-08-14 PROCEDURE — 92557 COMPREHENSIVE HEARING TEST: CPT | Performed by: AUDIOLOGIST

## 2024-08-14 NOTE — PROGRESS NOTES
AUDIOLOGY REPORT    SUBJECTIVE:  Jeffy Machado is a 69 year old male who was seen in the Audiology Clinic at the [facility] and Surgery [facility] for audiologic evaluation and hearing aid check.The patient has been seen previously in this clinic on 8/17/23 for assessment and results indicated Mild rising to normal sloping to moderately-severe SNHL right ear. Mild sloping to profound mixed hearing loss left ear. Jeffy was fit with a left Phonak Audeo L50-R on 12/14/22. Jeffy has a significant history for a left partial stapedectomy on 5/31/17.     Jeffy reports his hearing is possibly worst but isn't sure. He also reports that the hearing aid feels loose on the top of his ear and wonders in the  wire is too long.    OBJECTIVE:  Otoscopic exam indicates ears are clear of cerumen bilaterally     Pure Tone Thresholds assessed using conventional audiometry with good  reliability from 250-8000 Hz bilaterally using insert earphones and circumaural headphones     RIGHT:  mild sloping to moderate-severe sensorineural hearing loss    LEFT:    mild sloping to profound sensorineural hearing loss    Tympanogram:    RIGHT: normal eardrum mobility    LEFT:   Did not test due to surgical ear    Reflexes (reported by stimulus ear):  RIGHT: Ipsilateral is present at elevated levels  RIGHT: Contralateral did not test due to surgical ear  LEFT:   Ipsilateral did not test due to surgical ear  LEFT:   Contralateral is absent at frequencies tested      Speech Reception Threshold:    RIGHT: 30 dB HL    LEFT:   50 dB HL  Word Recognition Score:     RIGHT: 100% at 65 dB HL using NU-6 recorded word list.    LEFT:   96% at 85 dB HL using NU-6 recorded word list.      Hearing aid was checked and was in good working condition. The hearing aid was cleaned and dried during today's appointment. The dome and wax filter were replaced. Listening check was good. The  wire was changed to a 1M (no 1S was in stock) and he  reported the hearing aid felt much more snug. No charge for hearing aid check as hearing aid is in warranty.    ASSESSMENT:  Compared to patient's previous audiogram dated 8/17/23, thresholds and word recognition is stable. No charge hearing aid clean and checked today.      PLAN:    Jeffy will return for follow-up as needed, or at least once a year for cleaning and assessment of hearing aid, and for monitoring of hearing sensitivity. Please call this clinic with any questions regarding today s appointment.    Parrish Vu, [facility]  Licensed Audiologist  [place] License #[id number]

## 2024-11-27 ENCOUNTER — OFFICE VISIT (OUTPATIENT)
Dept: OTOLARYNGOLOGY | Facility: CLINIC | Age: 69
End: 2024-11-27
Payer: MEDICARE

## 2024-11-27 VITALS
BODY MASS INDEX: 31.21 KG/M2 | SYSTOLIC BLOOD PRESSURE: 148 MMHG | HEIGHT: 70 IN | HEART RATE: 63 BPM | DIASTOLIC BLOOD PRESSURE: 92 MMHG | WEIGHT: 218 LBS

## 2024-11-27 DIAGNOSIS — J34.89 NASAL OBSTRUCTION: Primary | ICD-10-CM

## 2024-11-27 DIAGNOSIS — J34.89 NASAL SEPTAL PERFORATION: ICD-10-CM

## 2024-11-27 NOTE — PROGRESS NOTES
Facial Plastic and Reconstructive Surgery      Jeffy Machado comes in for follow up. He is doing well and feels good. He had not had purulence or epistaxis.       PROCEDURE: Diagnostic nasal endoscopy   Indication: septal perforation, nasal obstruction  Performed by: Areli Angel MD    Nasal Endoscopy is performed to provide a more thorough evaluation of the nasal airway than seen on standard nasal speculum exam.  Findings are as follows:   The scope was passed in the nasal passage bilaterally, the nasal mucosa looks good and healthy. The septal perforation is intact, stable and very healthy. The superior meatus, middle meatus and inferior meatus is visualized on both sides with no pathology. He tolerated the procedure well.    A/P:  He will come back as need  Exam looks very good

## 2024-11-27 NOTE — LETTER
11/27/2024       RE: Jeffy Machado  [address] [facility] [address]     Dear Colleague,    Thank you for referring your patient, Jeffy Machado, to the [facility] EAR NOSE AND THROAT CLINIC [place] at [facility]. Please see a copy of my visit note below.    Facial Plastic and Reconstructive Surgery      Jeffy Machado comes in for follow up. He is doing well and feels good. He had not had purulence or epistaxis.       PROCEDURE: Diagnostic nasal endoscopy   Indication: septal perforation, nasal obstruction  Performed by: Areli Angel MD    Nasal Endoscopy is performed to provide a more thorough evaluation of the nasal airway than seen on standard nasal speculum exam.  Findings are as follows:   The scope was passed in the nasal passage bilaterally, the nasal mucosa looks good and healthy. The septal perforation is intact, stable and very healthy. The superior meatus, middle meatus and inferior meatus is visualized on both sides with no pathology. He tolerated the procedure well.    A/P:  He will come back as need  Exam looks very good      Again, thank you for allowing me to participate in the care of your patient.      Sincerely,    Areli Angel MD

## 2025-08-26 ENCOUNTER — OFFICE VISIT (OUTPATIENT)
Dept: AUDIOLOGY | Facility: CLINIC | Age: 70
End: 2025-08-26
Payer: MEDICARE

## 2025-08-26 DIAGNOSIS — H90.72 MIXED HEARING LOSS OF LEFT EAR: Primary | ICD-10-CM

## 2025-08-26 DIAGNOSIS — H90.5 SENSORINEURAL HEARING LOSS OF RIGHT EAR: ICD-10-CM

## (undated) DEVICE — DRAPE MICROSCOPE LEICA 54X150" AR8033650

## (undated) DEVICE — DRSG STERI STRIP 1/2X4" R1547

## (undated) DEVICE — NDL ANGIOCATH 14GA 1.25" 4048

## (undated) DEVICE — DRAPE WARMER 66X44" ORS-300

## (undated) DEVICE — DRSG TEGADERM 2 3/8X2 3/4" 1624W

## (undated) DEVICE — NIM ELEC SUBDERMAL NDL 3PAIR/BOX

## (undated) DEVICE — NDL 27GA 1.25" 305136

## (undated) DEVICE — SU CHROMIC 4-0 RB-1 27" U203H

## (undated) DEVICE — GLOVE LINER FINGER 1/2

## (undated) DEVICE — DRSG BANDAID 1X3" FABRIC CURITY LATEX FREE KC44101

## (undated) DEVICE — DRAPE EAR CHRONIC LATEX FREE 3609

## (undated) DEVICE — ESU GROUND PAD ADULT W/CORD E7507

## (undated) DEVICE — LINEN TOWEL PACK X5 5464

## (undated) DEVICE — RX BACITRACIN OINTMENT 0.9G 1/32OZ CUR001109

## (undated) DEVICE — BLADE KNIFE BEAVER MINI BEAVER6400

## (undated) DEVICE — SOL NACL 0.9% IRRIG 1000ML BOTTLE 2F7124

## (undated) DEVICE — SPONGE SURGIFOAM 100 1974

## (undated) DEVICE — SYR 01ML TBC SLIP TIP W/O NDL

## (undated) DEVICE — DRSG COTTON BALL 6PK LCB62

## (undated) DEVICE — DRAPE STERI TOWEL SM 1000

## (undated) DEVICE — PREP POVIDONE IODINE SOLUTION 10% 120ML

## (undated) DEVICE — SOL WATER IRRIG 1000ML BOTTLE 2F7114

## (undated) DEVICE — TONGUE DEPRESSOR STERILE 25-705-ALL

## (undated) DEVICE — SUCTION MANIFOLD NEPTUNE 2 SYS 4 PORT 0702-020-000

## (undated) DEVICE — GLOVE GAMMEX NEOPRENE ULTRA SZ 7 LF 8514

## (undated) DEVICE — DRSG GLASSCOCK ADULT S-1000

## (undated) DEVICE — PREP PAD ALCOHOL 6818

## (undated) DEVICE — LINEN GOWN XLG 5407

## (undated) DEVICE — ADH LIQUID MASTISOL TOPICAL VIAL 2-3ML 0523-48

## (undated) DEVICE — PACK ENT MINOR CUSTOM ASC

## (undated) DEVICE — SYR 10ML LL W/O NDL

## (undated) DEVICE — PREP SKIN SCRUB TRAY 4461A

## (undated) DEVICE — WIPE INSTRUMENT MEROCEL 400200

## (undated) DEVICE — DRAPE STOCKINETTE IMPERVIOUS 10" 21048

## (undated) DEVICE — BLADE KNIFE BEAVER TYMPANOPLASTY 2.5MM W/60D DOWN 377200

## (undated) RX ORDER — PROPOFOL 10 MG/ML
INJECTION, EMULSION INTRAVENOUS
Status: DISPENSED
Start: 2017-05-31

## (undated) RX ORDER — REMIFENTANIL HYDROCHLORIDE 1 MG/ML
INJECTION, POWDER, LYOPHILIZED, FOR SOLUTION INTRAVENOUS
Status: DISPENSED
Start: 2017-05-31

## (undated) RX ORDER — GABAPENTIN 300 MG/1
CAPSULE ORAL
Status: DISPENSED
Start: 2017-05-31

## (undated) RX ORDER — ONDANSETRON 2 MG/ML
INJECTION INTRAMUSCULAR; INTRAVENOUS
Status: DISPENSED
Start: 2017-05-31

## (undated) RX ORDER — GLYCOPYRROLATE 0.2 MG/ML
INJECTION INTRAMUSCULAR; INTRAVENOUS
Status: DISPENSED
Start: 2017-05-31

## (undated) RX ORDER — DEXAMETHASONE SODIUM PHOSPHATE 10 MG/ML
INJECTION, SOLUTION INTRAMUSCULAR; INTRAVENOUS
Status: DISPENSED
Start: 2017-05-31

## (undated) RX ORDER — PHENYLEPHRINE HCL IN 0.9% NACL 1 MG/10 ML
SYRINGE (ML) INTRAVENOUS
Status: DISPENSED
Start: 2017-05-31

## (undated) RX ORDER — ACETAMINOPHEN 325 MG/1
TABLET ORAL
Status: DISPENSED
Start: 2017-05-31

## (undated) RX ORDER — FENTANYL CITRATE 50 UG/ML
INJECTION, SOLUTION INTRAMUSCULAR; INTRAVENOUS
Status: DISPENSED
Start: 2017-05-31

## (undated) RX ORDER — EPHEDRINE SULFATE 50 MG/ML
INJECTION, SOLUTION INTRAMUSCULAR; INTRAVENOUS; SUBCUTANEOUS
Status: DISPENSED
Start: 2017-05-31

## (undated) RX ORDER — LIDOCAINE HYDROCHLORIDE 10 MG/ML
INJECTION, SOLUTION EPIDURAL; INFILTRATION; INTRACAUDAL; PERINEURAL
Status: DISPENSED
Start: 2017-05-31